# Patient Record
Sex: MALE | Race: BLACK OR AFRICAN AMERICAN | Employment: UNEMPLOYED | URBAN - METROPOLITAN AREA
[De-identification: names, ages, dates, MRNs, and addresses within clinical notes are randomized per-mention and may not be internally consistent; named-entity substitution may affect disease eponyms.]

---

## 2022-02-08 ENCOUNTER — APPOINTMENT (OUTPATIENT)
Dept: CT IMAGING | Age: 57
DRG: 045 | End: 2022-02-08
Attending: STUDENT IN AN ORGANIZED HEALTH CARE EDUCATION/TRAINING PROGRAM
Payer: COMMERCIAL

## 2022-02-08 ENCOUNTER — HOSPITAL ENCOUNTER (INPATIENT)
Age: 57
LOS: 2 days | Discharge: HOME | DRG: 045 | End: 2022-02-10
Attending: STUDENT IN AN ORGANIZED HEALTH CARE EDUCATION/TRAINING PROGRAM | Admitting: HOSPITALIST
Payer: COMMERCIAL

## 2022-02-08 DIAGNOSIS — I10 HYPERTENSION, UNSPECIFIED TYPE: ICD-10-CM

## 2022-02-08 DIAGNOSIS — R73.9 HYPERGLYCEMIA: ICD-10-CM

## 2022-02-08 DIAGNOSIS — E11.65 UNCONTROLLED TYPE 2 DIABETES MELLITUS WITH HYPERGLYCEMIA (HCC): ICD-10-CM

## 2022-02-08 DIAGNOSIS — I63.9 ACUTE ISCHEMIC STROKE (HCC): Primary | ICD-10-CM

## 2022-02-08 DIAGNOSIS — H53.461 HOMONYMOUS HEMIANOPIA, RIGHT: ICD-10-CM

## 2022-02-08 LAB
ALBUMIN SERPL-MCNC: 3.8 G/DL (ref 3.5–5)
ALBUMIN/GLOB SERPL: 1 {RATIO} (ref 1.1–2.2)
ALP SERPL-CCNC: 76 U/L (ref 45–117)
ALT SERPL-CCNC: 40 U/L (ref 12–78)
ANION GAP SERPL CALC-SCNC: 12 MMOL/L (ref 5–15)
AST SERPL-CCNC: 27 U/L (ref 15–37)
BASOPHILS # BLD: 0 K/UL (ref 0–0.1)
BASOPHILS NFR BLD: 1 % (ref 0–1)
BILIRUB SERPL-MCNC: 0.6 MG/DL (ref 0.2–1)
BUN SERPL-MCNC: 9 MG/DL (ref 6–20)
BUN/CREAT SERPL: 13 (ref 12–20)
CALCIUM SERPL-MCNC: 9.2 MG/DL (ref 8.5–10.1)
CHLORIDE SERPL-SCNC: 99 MMOL/L (ref 97–108)
CO2 SERPL-SCNC: 26 MMOL/L (ref 21–32)
CREAT SERPL-MCNC: 0.69 MG/DL (ref 0.7–1.3)
DIFFERENTIAL METHOD BLD: ABNORMAL
EOSINOPHIL # BLD: 0.1 K/UL (ref 0–0.4)
EOSINOPHIL NFR BLD: 1 % (ref 0–7)
ERYTHROCYTE [DISTWIDTH] IN BLOOD BY AUTOMATED COUNT: 12.6 % (ref 11.5–14.5)
GLOBULIN SER CALC-MCNC: 3.9 G/DL (ref 2–4)
GLUCOSE BLD STRIP.AUTO-MCNC: 342 MG/DL (ref 65–117)
GLUCOSE SERPL-MCNC: 362 MG/DL (ref 65–100)
HCT VFR BLD AUTO: 48.5 % (ref 36.6–50.3)
HGB BLD-MCNC: 16.6 G/DL (ref 12.1–17)
IMM GRANULOCYTES # BLD AUTO: 0 K/UL (ref 0–0.04)
IMM GRANULOCYTES NFR BLD AUTO: 1 % (ref 0–0.5)
INR PPP: 1 (ref 0.9–1.1)
LYMPHOCYTES # BLD: 1.7 K/UL (ref 0.8–3.5)
LYMPHOCYTES NFR BLD: 32 % (ref 12–49)
MCH RBC QN AUTO: 29 PG (ref 26–34)
MCHC RBC AUTO-ENTMCNC: 34.2 G/DL (ref 30–36.5)
MCV RBC AUTO: 84.8 FL (ref 80–99)
MONOCYTES # BLD: 0.7 K/UL (ref 0–1)
MONOCYTES NFR BLD: 13 % (ref 5–13)
NEUTS SEG # BLD: 2.8 K/UL (ref 1.8–8)
NEUTS SEG NFR BLD: 52 % (ref 32–75)
NRBC # BLD: 0 K/UL (ref 0–0.01)
NRBC BLD-RTO: 0 PER 100 WBC
PLATELET # BLD AUTO: 168 K/UL (ref 150–400)
PMV BLD AUTO: 9.1 FL (ref 8.9–12.9)
POTASSIUM SERPL-SCNC: 4.2 MMOL/L (ref 3.5–5.1)
PROT SERPL-MCNC: 7.7 G/DL (ref 6.4–8.2)
PROTHROMBIN TIME: 9.9 SEC (ref 9–11.1)
RBC # BLD AUTO: 5.72 M/UL (ref 4.1–5.7)
SERVICE CMNT-IMP: ABNORMAL
SODIUM SERPL-SCNC: 137 MMOL/L (ref 136–145)
WBC # BLD AUTO: 5.3 K/UL (ref 4.1–11.1)

## 2022-02-08 PROCEDURE — 85610 PROTHROMBIN TIME: CPT

## 2022-02-08 PROCEDURE — 85025 COMPLETE CBC W/AUTO DIFF WBC: CPT

## 2022-02-08 PROCEDURE — 70496 CT ANGIOGRAPHY HEAD: CPT

## 2022-02-08 PROCEDURE — 74011250636 HC RX REV CODE- 250/636: Performed by: STUDENT IN AN ORGANIZED HEALTH CARE EDUCATION/TRAINING PROGRAM

## 2022-02-08 PROCEDURE — 93005 ELECTROCARDIOGRAM TRACING: CPT

## 2022-02-08 PROCEDURE — 36415 COLL VENOUS BLD VENIPUNCTURE: CPT

## 2022-02-08 PROCEDURE — 70450 CT HEAD/BRAIN W/O DYE: CPT

## 2022-02-08 PROCEDURE — 99285 EMERGENCY DEPT VISIT HI MDM: CPT

## 2022-02-08 PROCEDURE — 80053 COMPREHEN METABOLIC PANEL: CPT

## 2022-02-08 PROCEDURE — 74011000636 HC RX REV CODE- 636: Performed by: STUDENT IN AN ORGANIZED HEALTH CARE EDUCATION/TRAINING PROGRAM

## 2022-02-08 PROCEDURE — 82962 GLUCOSE BLOOD TEST: CPT

## 2022-02-08 PROCEDURE — 96360 HYDRATION IV INFUSION INIT: CPT

## 2022-02-08 PROCEDURE — 74011250637 HC RX REV CODE- 250/637: Performed by: STUDENT IN AN ORGANIZED HEALTH CARE EDUCATION/TRAINING PROGRAM

## 2022-02-08 PROCEDURE — 65270000029 HC RM PRIVATE

## 2022-02-08 RX ORDER — LABETALOL HYDROCHLORIDE 5 MG/ML
10 INJECTION, SOLUTION INTRAVENOUS
Status: ACTIVE | OUTPATIENT
Start: 2022-02-08 | End: 2022-02-09

## 2022-02-08 RX ORDER — LABETALOL HYDROCHLORIDE 5 MG/ML
10 INJECTION, SOLUTION INTRAVENOUS ONCE
Status: DISCONTINUED | OUTPATIENT
Start: 2022-02-08 | End: 2022-02-08

## 2022-02-08 RX ORDER — ASPIRIN 325 MG
325 TABLET ORAL
Status: COMPLETED | OUTPATIENT
Start: 2022-02-08 | End: 2022-02-08

## 2022-02-08 RX ADMIN — ASPIRIN 325 MG ORAL TABLET 325 MG: 325 PILL ORAL at 18:11

## 2022-02-08 RX ADMIN — SODIUM CHLORIDE 1000 ML: 9 INJECTION, SOLUTION INTRAVENOUS at 16:00

## 2022-02-08 RX ADMIN — IOPAMIDOL 100 ML: 755 INJECTION, SOLUTION INTRAVENOUS at 17:43

## 2022-02-08 NOTE — ED PROVIDER NOTES
Chief Complaint   Patient presents with    Loss of Vision     This is a 61-year-old male with a history of hypertension, diabetes, psoriasis, referred here by his ophthalmologist for right temporal peripheral vision loss which he first appreciated 1.5 weeks ago. He had a normal funduscopic exam in the office and was referred here due to concern for an acute stroke. He reports intermittent dizziness, characterized as a sensation of feeling off balance during this time, but has been ambulatory. Denies any fevers, headache, neck pain, vomiting today. No speech deficit. No lateralizing weakness or sensory changes. He has not had medical insurance and therefore has not taken his medications for blood pressure or diabetes for years. No history of prior stroke. No other systemic complaints. Symptoms are moderate in nature without alleviating factors. Past Medical History:   Diagnosis Date    Diabetes ([facility] [address]     Hypertension     Psoriasis        History reviewed. No pertinent surgical history. History reviewed. No pertinent family history. Social History     Socioeconomic History    Marital status:      Spouse name: Not on file    Number of children: Not on file    Years of education: Not on file    Highest education level: Not on file   Occupational History    Not on file   Tobacco Use    Smoking status: Never Smoker    Smokeless tobacco: Not on file   Substance and Sexual Activity    Alcohol use: Never    Drug use: Never    Sexual activity: Not on file   Other Topics Concern    Not on file   Social History Narrative    Not on file     Social Determinants of Health     Financial Resource Strain:     Difficulty of Paying Living Expenses: Not on file   Food Insecurity:     Worried About Running Out of Food in the Last Year: Not on file    Arabella of Food in the Last Year: Not on file   Transportation Needs:     Lack of Transportation (Medical):  Not on file    Lack of Transportation (Non-Medical): Not on file   Physical Activity:     Days of Exercise per Week: Not on file    Minutes of Exercise per Session: Not on file   Stress:     Feeling of Stress : Not on file   Social Connections:     Frequency of Communication with Friends and Family: Not on file    Frequency of Social Gatherings with Friends and Family: Not on file    Attends Orthodoxy Services: Not on file    Active Member of [address] [place] [address] or Organizations: Not on file    Attends Club or Organization Meetings: Not on file    Marital Status: Not on file   Intimate Partner Violence:     Fear of Current or Ex-Partner: Not on file    Emotionally Abused: Not on file    Physically Abused: Not on file    Sexually Abused: Not on file   Housing Stability:     Unable to Pay for Housing in the Last Year: Not on file    Number of [place] in the Last Year: Not on file    Unstable Housing in the Last Year: Not on file         ALLERGIES: Tree nut    Review of Systems   Constitutional: Negative for fever. HENT: Negative for facial swelling. Eyes: Positive for visual disturbance. Respiratory: Negative for shortness of breath. Cardiovascular: Negative for chest pain. Gastrointestinal: Negative for abdominal pain. Genitourinary: Negative for difficulty urinating. Musculoskeletal: Negative for neck pain. Neurological: Positive for dizziness. Psychiatric/Behavioral: Negative for confusion.        Vitals:    02/08/22 1551 02/08/22 1606 02/08/22 1621 02/08/22 1636   BP: (!) 177/110 (!) 175/103 (!) 178/104 (!) 178/102   Pulse: 86 80 79    Resp: 24 12 16    Temp:       SpO2: 98% 98% 99%    Weight:       Height:                Physical Exam  General:  Awake and alert, NAD  HEENT:  NC/AT, equal pupils, moist mucous membranes  Neck:   Normal inspection, full range of motion  Cardiac:  RRR, no murmurs  Respiratory:  Clear bilaterally, no wheezes, rales, rhonchi  Abdomen:  Soft and nontender, nondistended  Extremities: Warm and well perfused, no peripheral edema  Neuro:  +Right temporal homonymous hemianopia present, cranial nerves otherwise grossly intact, no pronator drift, strength is full and symmetric in the upper and lower extremities, no sensory deficits appreciated  Moving all extremities symmetrically without gross motor deficit  Skin:   +Well circumscribed patchy areas of raised erythema consistent with psoriasis    RESULTS  Recent Results (from the past 12 hour(s))   GLUCOSE, POC    Collection Time: 02/08/22  3:10 PM   Result Value Ref Range    Glucose (POC) 342 (H) 65 - 117 mg/dL    Performed by Kathleen Suazo ([facility])    CBC WITH AUTOMATED DIFF    Collection Time: 02/08/22  3:24 PM   Result Value Ref Range    WBC 5.3 4.1 - 11.1 K/uL    RBC 5.72 (H) 4.10 - 5.70 M/uL    HGB 16.6 12.1 - 17.0 g/dL    HCT 48.5 36.6 - 50.3 %    MCV 84.8 80.0 - 99.0 FL    MCH 29.0 26.0 - 34.0 PG    MCHC 34.2 30.0 - 36.5 g/dL    RDW 12.6 11.5 - 14.5 %    PLATELET 350 070 - 148 K/uL    MPV 9.1 8.9 - 12.9 FL    NRBC 0.0 0  WBC    ABSOLUTE NRBC 0.00 0.00 - 0.01 K/uL    NEUTROPHILS 52 32 - 75 %    LYMPHOCYTES 32 12 - 49 %    MONOCYTES 13 5 - 13 %    EOSINOPHILS 1 0 - 7 %    BASOPHILS 1 0 - 1 %    IMMATURE GRANULOCYTES 1 (H) 0.0 - 0.5 %    ABS. NEUTROPHILS 2.8 1.8 - 8.0 K/UL    ABS. LYMPHOCYTES 1.7 0.8 - 3.5 K/UL    ABS. MONOCYTES 0.7 0.0 - 1.0 K/UL    ABS. EOSINOPHILS 0.1 0.0 - 0.4 K/UL    ABS. BASOPHILS 0.0 0.0 - 0.1 K/UL    ABS. IMM.  GRANS. 0.0 0.00 - 0.04 K/UL    DF AUTOMATED     METABOLIC PANEL, COMPREHENSIVE    Collection Time: 02/08/22  3:24 PM   Result Value Ref Range    Sodium 137 136 - 145 mmol/L    Potassium 4.2 3.5 - 5.1 mmol/L    Chloride 99 97 - 108 mmol/L    CO2 26 21 - 32 mmol/L    Anion gap 12 5 - 15 mmol/L    Glucose 362 (H) 65 - 100 mg/dL    BUN 9 6 - 20 MG/DL    Creatinine 0.69 (L) 0.70 - 1.30 MG/DL    BUN/Creatinine ratio 13 12 - 20      GFR est AA >60 >60 ml/min/1.73m2    GFR est non-AA >60 >60 ml/min/1.73m2    Calcium 9.2 8.5 - 10.1 MG/DL    Bilirubin, total 0.6 0.2 - 1.0 MG/DL    ALT (SGPT) 40 12 - 78 U/L    AST (SGOT) 27 15 - 37 U/L    Alk. phosphatase 76 45 - 117 U/L    Protein, total 7.7 6.4 - 8.2 g/dL    Albumin 3.8 3.5 - 5.0 g/dL    Globulin 3.9 2.0 - 4.0 g/dL    A-G Ratio 1.0 (L) 1.1 - 2.2     PROTHROMBIN TIME + INR    Collection Time: 02/08/22  3:24 PM   Result Value Ref Range    INR 1.0 0.9 - 1.1      Prothrombin time 9.9 9.0 - 11.1 sec        IMAGING  CT HEAD WO CONT    Result Date: 2/8/2022  Hypodensity in the left occipital lobe most consistent with subacute ischemia. CTA HEAD NECK W CONT    Result Date: 2/8/2022  1. Subacute ischemia left occipital lobe ischemia. 2.  No large vessel occlusion is identified. There is moderate stenosis of the basilar artery and irregularity and possible stenosis of the left P1 segment. 3.  Multiple hypodensities in the thyroid gland. .      Procedures - none unless documented below  EKG as interpreted by me:  Normal sinus rhythm at a rate of 83, normal axis, normal QRS interval, LVH by aVL criteria, grossly no ST or T-wave changes suggesting acute ischemia. ED course: Labs, EKG and imaging reviewed. NIHSS 1 at the time of my exam.  Subacute left occipital lobe infarct on CT, no large vessel occlusion on angiogram studies. Aspirin 324 mg given. Will admit for continued management, discussed with the hospitalist.      Sarah Viramontes Serve for Admission  3:27 PM    ED Room Number:   SER05/05  Patient Name and age: Mayra Pacheco 49 y.o.  male  Working Diagnosis:     1. Acute ischemic stroke ([address]    2. Homonymous hemianopia, right    3. Hyperglycemia    4.  Hypertension, unspecified type      COVID suspicion:   no  Code Status:    Full Code  Readmission:    no  Isolation Requirements:  no  Recommended Level of Care: telemetry  Department:    [place] [facility] - [phone]  Other:     Subacute left occipital lobe infarct on CT, no large vessel occlusion. Aspirin 324 mg given.

## 2022-02-08 NOTE — ED TRIAGE NOTES
Ambulatory.  1 1/2 weeks ago lost vision in right eye on the right side.  was seen at the eye doctor today and was told that it was not an eye problem and to go to the ED for stroke. Patient states mild intermittent dizziness.

## 2022-02-09 ENCOUNTER — APPOINTMENT (OUTPATIENT)
Dept: MRI IMAGING | Age: 57
DRG: 045 | End: 2022-02-09
Attending: HOSPITALIST
Payer: COMMERCIAL

## 2022-02-09 LAB
ALBUMIN SERPL-MCNC: 3.3 G/DL (ref 3.5–5)
ALBUMIN/GLOB SERPL: 1 {RATIO} (ref 1.1–2.2)
ALP SERPL-CCNC: 69 U/L (ref 45–117)
ALT SERPL-CCNC: 37 U/L (ref 12–78)
ANION GAP SERPL CALC-SCNC: 6 MMOL/L (ref 5–15)
AST SERPL-CCNC: 21 U/L (ref 15–37)
ATRIAL RATE: 83 BPM
BASOPHILS # BLD: 0 K/UL (ref 0–0.1)
BASOPHILS NFR BLD: 1 % (ref 0–1)
BILIRUB SERPL-MCNC: 0.8 MG/DL (ref 0.2–1)
BUN SERPL-MCNC: 6 MG/DL (ref 6–20)
BUN/CREAT SERPL: 10 (ref 12–20)
CALCIUM SERPL-MCNC: 8.7 MG/DL (ref 8.5–10.1)
CALCULATED P AXIS, ECG09: 47 DEGREES
CALCULATED R AXIS, ECG10: 14 DEGREES
CALCULATED T AXIS, ECG11: -18 DEGREES
CHLORIDE SERPL-SCNC: 102 MMOL/L (ref 97–108)
CHOLEST SERPL-MCNC: 213 MG/DL
CO2 SERPL-SCNC: 26 MMOL/L (ref 21–32)
COVID-19 RAPID TEST, COVR: NOT DETECTED
CREAT SERPL-MCNC: 0.62 MG/DL (ref 0.7–1.3)
DIAGNOSIS, 93000: NORMAL
DIFFERENTIAL METHOD BLD: ABNORMAL
EOSINOPHIL # BLD: 0.1 K/UL (ref 0–0.4)
EOSINOPHIL NFR BLD: 2 % (ref 0–7)
ERYTHROCYTE [DISTWIDTH] IN BLOOD BY AUTOMATED COUNT: 12.3 % (ref 11.5–14.5)
EST. AVERAGE GLUCOSE BLD GHB EST-MCNC: 298 MG/DL
GLOBULIN SER CALC-MCNC: 3.3 G/DL (ref 2–4)
GLUCOSE BLD STRIP.AUTO-MCNC: 313 MG/DL (ref 65–117)
GLUCOSE BLD STRIP.AUTO-MCNC: 327 MG/DL (ref 65–117)
GLUCOSE BLD STRIP.AUTO-MCNC: 339 MG/DL (ref 65–117)
GLUCOSE SERPL-MCNC: 326 MG/DL (ref 65–100)
HBA1C MFR BLD: 12 % (ref 4–5.6)
HCT VFR BLD AUTO: 45.4 % (ref 36.6–50.3)
HDLC SERPL-MCNC: 49 MG/DL
HDLC SERPL: 4.3 {RATIO} (ref 0–5)
HGB BLD-MCNC: 15.2 G/DL (ref 12.1–17)
IMM GRANULOCYTES # BLD AUTO: 0 K/UL (ref 0–0.04)
IMM GRANULOCYTES NFR BLD AUTO: 0 % (ref 0–0.5)
LDLC SERPL CALC-MCNC: 147.2 MG/DL (ref 0–100)
LYMPHOCYTES # BLD: 1.4 K/UL (ref 0.8–3.5)
LYMPHOCYTES NFR BLD: 33 % (ref 12–49)
MCH RBC QN AUTO: 28.7 PG (ref 26–34)
MCHC RBC AUTO-ENTMCNC: 33.5 G/DL (ref 30–36.5)
MCV RBC AUTO: 85.7 FL (ref 80–99)
MONOCYTES # BLD: 0.5 K/UL (ref 0–1)
MONOCYTES NFR BLD: 12 % (ref 5–13)
NEUTS SEG # BLD: 2.1 K/UL (ref 1.8–8)
NEUTS SEG NFR BLD: 52 % (ref 32–75)
NRBC # BLD: 0 K/UL (ref 0–0.01)
NRBC BLD-RTO: 0 PER 100 WBC
P-R INTERVAL, ECG05: 224 MS
PLATELET # BLD AUTO: 165 K/UL (ref 150–400)
PMV BLD AUTO: 8.8 FL (ref 8.9–12.9)
POTASSIUM SERPL-SCNC: 3.8 MMOL/L (ref 3.5–5.1)
PROT SERPL-MCNC: 6.6 G/DL (ref 6.4–8.2)
Q-T INTERVAL, ECG07: 412 MS
QRS DURATION, ECG06: 104 MS
QTC CALCULATION (BEZET), ECG08: 484 MS
RBC # BLD AUTO: 5.3 M/UL (ref 4.1–5.7)
SERVICE CMNT-IMP: ABNORMAL
SODIUM SERPL-SCNC: 134 MMOL/L (ref 136–145)
SOURCE, COVRS: NORMAL
TRIGL SERPL-MCNC: 84 MG/DL (ref ?–150)
VENTRICULAR RATE, ECG03: 83 BPM
VLDLC SERPL CALC-MCNC: 16.8 MG/DL
WBC # BLD AUTO: 4.2 K/UL (ref 4.1–11.1)

## 2022-02-09 PROCEDURE — 97535 SELF CARE MNGMENT TRAINING: CPT

## 2022-02-09 PROCEDURE — 97112 NEUROMUSCULAR REEDUCATION: CPT

## 2022-02-09 PROCEDURE — 97165 OT EVAL LOW COMPLEX 30 MIN: CPT

## 2022-02-09 PROCEDURE — 70551 MRI BRAIN STEM W/O DYE: CPT

## 2022-02-09 PROCEDURE — 80053 COMPREHEN METABOLIC PANEL: CPT

## 2022-02-09 PROCEDURE — 65660000000 HC RM CCU STEPDOWN

## 2022-02-09 PROCEDURE — 74011636637 HC RX REV CODE- 636/637: Performed by: HOSPITALIST

## 2022-02-09 PROCEDURE — 99222 1ST HOSP IP/OBS MODERATE 55: CPT | Performed by: PSYCHIATRY & NEUROLOGY

## 2022-02-09 PROCEDURE — 36415 COLL VENOUS BLD VENIPUNCTURE: CPT

## 2022-02-09 PROCEDURE — 83036 HEMOGLOBIN GLYCOSYLATED A1C: CPT

## 2022-02-09 PROCEDURE — 85025 COMPLETE CBC W/AUTO DIFF WBC: CPT

## 2022-02-09 PROCEDURE — 80061 LIPID PANEL: CPT

## 2022-02-09 PROCEDURE — 82962 GLUCOSE BLOOD TEST: CPT

## 2022-02-09 PROCEDURE — 74011250637 HC RX REV CODE- 250/637: Performed by: HOSPITALIST

## 2022-02-09 PROCEDURE — 87635 SARS-COV-2 COVID-19 AMP PRB: CPT

## 2022-02-09 RX ORDER — DEXTROSE 50 % IN WATER (D50W) INTRAVENOUS SYRINGE
12.5-25 AS NEEDED
Status: DISCONTINUED | OUTPATIENT
Start: 2022-02-09 | End: 2022-02-09 | Stop reason: RX

## 2022-02-09 RX ORDER — GUAIFENESIN 100 MG/5ML
81 LIQUID (ML) ORAL DAILY
Status: DISCONTINUED | OUTPATIENT
Start: 2022-02-09 | End: 2022-02-10 | Stop reason: HOSPADM

## 2022-02-09 RX ORDER — AMLODIPINE BESYLATE 5 MG/1
5 TABLET ORAL DAILY
Status: DISCONTINUED | OUTPATIENT
Start: 2022-02-09 | End: 2022-02-10 | Stop reason: HOSPADM

## 2022-02-09 RX ORDER — INSULIN LISPRO 100 [IU]/ML
INJECTION, SOLUTION INTRAVENOUS; SUBCUTANEOUS
Status: DISCONTINUED | OUTPATIENT
Start: 2022-02-09 | End: 2022-02-10 | Stop reason: HOSPADM

## 2022-02-09 RX ORDER — MAGNESIUM SULFATE 100 %
4 CRYSTALS MISCELLANEOUS AS NEEDED
Status: DISCONTINUED | OUTPATIENT
Start: 2022-02-09 | End: 2022-02-10 | Stop reason: HOSPADM

## 2022-02-09 RX ORDER — ATORVASTATIN CALCIUM 40 MG/1
40 TABLET, FILM COATED ORAL DAILY
Status: DISCONTINUED | OUTPATIENT
Start: 2022-02-09 | End: 2022-02-09

## 2022-02-09 RX ORDER — ATORVASTATIN CALCIUM 40 MG/1
80 TABLET, FILM COATED ORAL DAILY
Status: DISCONTINUED | OUTPATIENT
Start: 2022-02-10 | End: 2022-02-10 | Stop reason: HOSPADM

## 2022-02-09 RX ADMIN — AMLODIPINE BESYLATE 5 MG: 5 TABLET ORAL at 10:17

## 2022-02-09 RX ADMIN — Medication 7 UNITS: at 17:07

## 2022-02-09 RX ADMIN — ASPIRIN 81 MG 81 MG: 81 TABLET ORAL at 10:17

## 2022-02-09 RX ADMIN — Medication 4 UNITS: at 22:59

## 2022-02-09 RX ADMIN — Medication 7 UNITS: at 11:40

## 2022-02-09 RX ADMIN — ATORVASTATIN CALCIUM 40 MG: 40 TABLET, FILM COATED ORAL at 10:17

## 2022-02-09 NOTE — CONSULTS
[address] [place] [address]    Patient: Jorge Joy MRN: [id number]  SSN: [id number]    YOB: 1965  Age: 49 y.o. Sex: male      Chief Complaint: Vision loss    Subjective: Jorge Joy is a 49 y.o. M with a pmh of HTN, psoriasis and DMII who presented to the ED on 2/8/22. He was referred here by his ophthalmologist for right temporal peripheral vision loss that started approx 1.5 weeks ago. Per notes, fundoscopic exam was normal. CT head was obtained which showed a hypodensity in the left occipital lobe most consistent with subacute ischemia. NIS has been consulted because CTA head/neck showed moderate stenosis of the  basilar artery and irregularity and possible stenosis of the left P1 segment. He states that he does not take any medications at home, reports his BP usually runs in the \"150s\" and he does not check his BG at home. He states his DMII is diet controlled and that he walks 2 miles every day. He does not take any ASA or blood thinner at home. He does not smoke cigarettes or use any street drugs. Past Medical History:   Diagnosis Date    Diabetes ([address]     Hypertension     Psoriasis      History reviewed. No pertinent family history. Social History     Tobacco Use    Smoking status: Never Smoker    Smokeless tobacco: Not on file   Substance Use Topics    Alcohol use: Never        Allergies   Allergen Reactions    Tree Nut Nausea and Vomiting     Review of Systems:  ROS negative. Denies numbness, tingling, nausea, vomiting, difficulty swallowing or speaking, headache, and weakness. Objective:     Vitals:    02/09/22 0415 02/09/22 0430 02/09/22 0537 02/09/22 0600   BP:  (!) 155/100 (!) 179/103    Pulse: 93 82 79 77   Resp: 17 12 12    Temp:   98 °F (36.7 °C)    SpO2: 97% 98% 99%    Weight:       Height:          Physical Exam:  GENERAL: Calm, cooperative, NAD  SKIN: Warm, dry, color appropriate for ethnicity.      Neurologic Exam:  Mental Status:  Alert and oriented x 4. Appropriate affect, mood and behavior. Language:    Normal fluency, repetition, comprehension and naming. Cranial Nerves:   Pupils 3 mm, equal, round and reactive to light. Visual fields right right hemianopsia. Extraocular movements intact. Facial sensation intact. Full facial strength, no asymmetry. Hearing grossly intact bilaterally. No dysarthria. Tongue protrudes to midline, palate elevates symmetrically. Shoulder shrug 5/5 bilaterally. Motor:    No pronator drift. Bulk and tone normal.      5/5 power in all extremities proximally and distally. No involuntary movements. Sensation:    Sensation intact throughout to light touch. Coordination & Gait: Gait deferred. FTN and HTS intact with no ataxia present. NIHSS:      1a-LOC:0    1b-Month/Age:0    1c-Open/Close Hand:0    2-Best Gaze:0    3-Visual Fields:2    4-Facial Palsy:0    5a-Left Arm:0    5b-Right Arm:0    6a-Left Le    6b-Right Le    7-Limb Ataxia:0    8-Sensory:0    9-Best Language:0    10-Dysarthria:0    11-Extinction/Inattention:0  TOTAL SCORE:2    Labs:  Lab Results   Component Value Date/Time    WBC 5.3 2022 03:24 PM    HGB 16.6 2022 03:24 PM    HCT 48.5 2022 03:24 PM    PLATELET 882  03:24 PM    MCV 84.8 2022 03:24 PM      Lab Results   Component Value Date/Time    Sodium 137 2022 03:24 PM    Potassium 4.2 2022 03:24 PM    Chloride 99 2022 03:24 PM    CO2 26 2022 03:24 PM    Anion gap 12 2022 03:24 PM    Glucose 362 (H) 2022 03:24 PM    BUN 9 2022 03:24 PM    Creatinine 0.69 (L) 2022 03:24 PM    BUN/Creatinine ratio 13 2022 03:24 PM    GFR est AA >60 2022 03:24 PM    GFR est non-AA >60 2022 03:24 PM    Calcium 9.2 2022 03:24 PM     Imaging:  CT Results (maximum last 3):   Results from [place] encounter on 02/08/22    CTA HEAD NECK W CONT    Narrative  EXAM: CTA HEAD NECK W CONT    INDICATION: Right temporal field cut    COMPARISON: None. CONTRAST: 100 mL of Isovue-370. TECHNIQUE:  Unenhanced  images were obtained to localize the volume for  acquisition. Multislice helical axial CT angiography was performed from the  aortic arch to the top of the head during uneventful rapid bolus intravenous  contrast administration. Coronal and sagittal reformations and 3D post  processing was performed. CT dose reduction was achieved through use of a  standardized protocol tailored for this examination and automatic exposure  control for dose modulation. FINDINGS:    CTA NECK  There is conventional three vessel arch anatomy. The bilateral subclavian,  common carotid, and internal carotid arteries are patent with no flow-limiting  stenosis. % of right carotid artery stenosis: No significant stenosis  % of left carotid artery stenosis: No significant stenosis    NASCET method was utilized for calculating stenosis. Left vertebral artery is dominant. Multiple hypodensities are present in the  thyroid gland. .  There are mild degenerative changes of the cervical spine. CTA HEAD  Left vertebral artery is dominant. Both vertebral arteries are patent. . There is  moderate stenosis of the basilar artery. The left posterior cerebral artery is  irregular but otherwise patent. . The right posterior cerebral artery is patent. .  There is no flow-limiting intracranial stenosis. There is no aneurysm. There are  no sizable posterior communicating arteries. Delayed images demonstrate hypodensity left occipital lobe with peripheral  enhancement compatible with subacute ischemia. Impression  1. Subacute ischemia left occipital lobe ischemia. 2.  No large vessel occlusion is identified. There is moderate stenosis of the  basilar artery and irregularity and possible stenosis of the left P1 segment.     3.  Multiple hypodensities in the thyroid gland. .      CT HEAD WO CONT    Narrative  EXAM: CT HEAD WO CONT    INDICATION: Right temporal field cut    COMPARISON: None. CONTRAST: None. TECHNIQUE: Unenhanced CT of the head was performed using 5 mm images. Brain and  bone windows were generated. Coronal and sagittal reformats. CT dose reduction  was achieved through use of a standardized protocol tailored for this  examination and automatic exposure control for dose modulation. FINDINGS:  The ventricles and sulci are normal in size, shape and configuration. . There is  no significant white matter disease. There is no intracranial hemorrhage,  extra-axial collection, or mass effect. The basilar cisterns are open. Ill-defined hypodensity is noted in the left occipital lobe. a    The bone windows demonstrate no abnormalities. The visualized portions of the  paranasal sinuses and mastoid air cells are clear. Impression  Hypodensity in the left occipital lobe most consistent with subacute ischemia. Assessment:     Hospital Problems  Never Reviewed          Codes Class Noted POA    Stroke (cerebrum) [facility]) ICD-10-CM: I63.9  ICD-9-CM: 434.91  2/8/2022 Unknown            Plan:     1.) Basilar artery stenosis   - As seen on CTA head with hx of poorly controlled HTN and DMII at home   - Cont ASA 81 mg daily   - No neurointervention recommended at this time    2.) Acute ischemic occipital lobe CVA   - MRI brain ordered   - Recommending optimizing BP and glucose control long term as outpatient   - Stoke work up per neurology       I have discussed the diagnosis and the intended plan as seen in the above orders with Dr. Danny Ruiz, the patient and the primary RN. Patient/family updated on current plan of care and is in agreement. All questions were answered. Thank you for this consult and participating in the care of this patient.   Signed By: Trini Lund NP     February 9, 2022

## 2022-02-09 NOTE — H&P
[address] [facility] Adult  Hospitalist Group  History and Physical    Date of Service:  2/9/2022  Primary Care Provider: None  Source of information: The patient and Chart review    Chief Complaint: Loss of Vision      History of Presenting Illness:   Rema Slade is a 49 y.o. male with past medical history of diabetes, hypertension-states that he is not on any medications(states that he does not have insurance, also told me that he wanted to try lifestyle changes without medications) he came to the hospital with a chief complaint of vision loss. Patient states that he had vision abnormalities for few years, he had problems in his left eye and recently for the last 1 week noticed blurry vision in the right eye. He has seen an ophthalmologist yesterday-he was told that he does not have peripheral vision in both eyes, he recommended evaluation in the hospital for stroke. Patient states that he was diagnosed with hypertension maybe 15- 20 years ago. He denied any chest pain, nausea/vomiting, abdominal pain, diarrhea, shortness of breath and other constitutional symptoms. He denied any weakness, numbness/tingling. States that he took 2 doses of COVID-19 vaccine and he is due for booster     REVIEW OF SYSTEMS:  A comprehensive review of systems was negative except for that written in the History of Present Illness. Past Medical History:   Diagnosis Date    Diabetes ([address]     Hypertension     Psoriasis       History reviewed. No pertinent surgical history. Prior to Admission medications    Not on File     Allergies   Allergen Reactions    Tree Nut Nausea and Vomiting      History reviewed. No pertinent family history. Social History:  reports that he has never smoked. He does not have any smokeless tobacco history on file. He reports that he does not drink alcohol and does not use drugs.      Family and social history were personally reviewed, all pertinent and relevant details are outlined as above.    Objective:     Visit Vitals  BP (!) 179/103 (BP 1 Location: Left upper arm, BP Patient Position: At rest)   Pulse 77   Temp 98 °F (36.7 °C)   Resp 12   Ht 5' 7\" (1.702 m)   Wt 69.9 kg (154 lb 1.6 oz)   SpO2 99%   BMI 24.14 kg/m²      O2 Device: None (Room air)    PHYSICAL EXAM:   General: Alert x oriented x 3, awake, no acute distress, resting in bed, pleasant ,appears to be stated age  [de-identified]:  EOMI, moist mucus membranes  Neck: Supple  Chest: Clear to auscultation bilaterally b/l   CVS: RRR, S1 S2 heard, no murmurs  Abd: Soft, non-tender, non-distended, +bowel sounds   Ext: No clubbing, no cyanosis, no edema  Neuro/Psych:temporal vision lost both the eyes, alert and oriented X 3, muscle strength b/l UE intact,sensation intact  Skin: Warm, dry, without rashes or lesions    Data Review: All diagnostic labs and studies have been reviewed. Abnormal Labs Reviewed   CBC WITH AUTOMATED DIFF - Abnormal; Notable for the following components:       Result Value    RBC 5.72 (*)     IMMATURE GRANULOCYTES 1 (*)     All other components within normal limits   METABOLIC PANEL, COMPREHENSIVE - Abnormal; Notable for the following components:    Glucose 362 (*)     Creatinine 0.69 (*)     A-G Ratio 1.0 (*)     All other components within normal limits   GLUCOSE, POC - Abnormal; Notable for the following components:    Glucose (POC) 342 (*)     All other components within normal limits       All Micro Results     Procedure Component Value Units Date/Time    COVID-19 RAPID TEST [[id number]]     Order Status: Sent           IMAGING:   CT HEAD WO CONT   Final Result   Hypodensity in the left occipital lobe most consistent with subacute ischemia. CTA HEAD NECK W CONT   Final Result      1. Subacute ischemia left occipital lobe ischemia. 2.  No large vessel occlusion is identified. There is moderate stenosis of the   basilar artery and irregularity and possible stenosis of the left P1 segment.       3. Multiple hypodensities in the thyroid gland. [facility] MRI BRAIN WO CONT    (Results Pending)        ECG/ECHO:  No results found for this or any previous visit. Assessment:   Given the patient's current clinical presentation, there is a high level of concern for decompensation if discharged from the emergency department. Complex decision making was performed, which includes reviewing the patient's available past medical records, laboratory results, and imaging studies. Active Problems:    Stroke (cerebrum) ([address] (2/8/2022)      # Subacute left occipital stroke  -Hypodensity in the left occipital lobe most consistent with subacute ischemia. There is moderate stenosis of the  basilar artery and irregularity and possible stenosis of the left P1 segment.   -MRI brain,echo, lipid profile,A1c,COVID 19 rapid. -Aspirin,statin    # Hypertension:   -start amlodipine today  Goal less than 140    # Diabetes:  -check A1c  Add insulin based on sugars                DIET: ADULT DIET Regular   ISOLATION PRECAUTIONS: There are currently no Active Isolations  CODE STATUS: Full Code   DVT PROPHYLAXIS: Heparin  FUNCTIONAL STATUS PRIOR TO HOSPITALIZATION: Fully active and ambulatory; able to carry on all self-care without restriction. EARLY MOBILITY ASSESSMENT: Recommend routine ambulation while hospitalized with the assistance of nursing staff  ANTICIPATED DISCHARGE: Greater than 48 hours. Signed By: Sujatha Fierro MD     February 9, 2022         Please note that this dictation may have been completed with Dragon, the computer voice recognition software. Quite often unanticipated grammatical, syntax, homophones, and other interpretive errors are inadvertently transcribed by the computer software. Please disregard these errors. Please excuse any errors that have escaped final proofreading.

## 2022-02-09 NOTE — ED NOTES
Bedside and Verbal shift change report given to Krystyna Rahman RN (oncoming nurse) by Nancy Anderson RN (offgoing nurse). Report included the following information SBAR, Kardex, MAR and Recent Results.

## 2022-02-09 NOTE — PROGRESS NOTES
Problem: Falls - Risk of  Goal: *Absence of Falls  Description: Document Sheyla Koehler Fall Risk and appropriate interventions in the flowsheet. Outcome: Progressing Towards Goal  Note: Fall Risk Interventions:   Call light in reach. Bedside table within reach.           Problem: Patient Education: Go to Patient Education Activity  Goal: Patient/Family Education  Outcome: Progressing Towards Goal     Problem: Patient Education: Go to Patient Education Activity  Goal: Patient/Family Education  Outcome: Progressing Towards Goal     Problem: TIA/CVA Stroke: 0-24 hours  Goal: Off Pathway (Use only if patient is Off Pathway)  Outcome: Progressing Towards Goal  Goal: Activity/Safety  Outcome: Progressing Towards Goal  Goal: Consults, if ordered  Outcome: Progressing Towards Goal  Goal: Diagnostic Test/Procedures  Outcome: Progressing Towards Goal  Goal: Nutrition/Diet  Outcome: Progressing Towards Goal  Goal: Discharge Planning  Outcome: Progressing Towards Goal  Goal: Medications  Outcome: Progressing Towards Goal  Goal: Respiratory  Outcome: Progressing Towards Goal  Goal: Treatments/Interventions/Procedures  Outcome: Progressing Towards Goal  Goal: Minimize risk of bleeding post-thrombolytic infusion  Outcome: Progressing Towards Goal  Goal: Monitor for complications post-thrombolytic infusion  Outcome: Progressing Towards Goal  Goal: Psychosocial  Outcome: Progressing Towards Goal  Goal: *Hemodynamically stable  Outcome: Progressing Towards Goal  Goal: *Neurologically stable  Description: Absence of additional neurological deficits    Outcome: Progressing Towards Goal  Goal: *Verbalizes anxiety and depression are reduced or absent  Outcome: Progressing Towards Goal  Goal: *Absence of Signs of Aspiration on Current Diet  Outcome: Progressing Towards Goal  Goal: *Absence of deep venous thrombosis signs and symptoms(Stroke Metric)  Outcome: Progressing Towards Goal  Goal: *Ability to perform ADLs and demonstrates progressive mobility and function  Outcome: Progressing Towards Goal  Goal: *Stroke education started(Stroke Metric)  Outcome: Progressing Towards Goal  Goal: *Dysphagia screen performed(Stroke Metric)  Outcome: Progressing Towards Goal  Goal: *Rehab consulted(Stroke Metric)  Outcome: Progressing Towards Goal     Problem: TIA/CVA Stroke: Day 2 Until Discharge  Goal: Off Pathway (Use only if patient is Off Pathway)  Outcome: Progressing Towards Goal  Goal: Activity/Safety  Outcome: Progressing Towards Goal  Goal: Diagnostic Test/Procedures  Outcome: Progressing Towards Goal  Goal: Nutrition/Diet  Outcome: Progressing Towards Goal  Goal: Discharge Planning  Outcome: Progressing Towards Goal  Goal: Medications  Outcome: Progressing Towards Goal  Goal: Respiratory  Outcome: Progressing Towards Goal  Goal: Treatments/Interventions/Procedures  Outcome: Progressing Towards Goal  Goal: Psychosocial  Outcome: Progressing Towards Goal  Goal: *Verbalizes anxiety and depression are reduced or absent  Outcome: Progressing Towards Goal  Goal: *Absence of aspiration  Outcome: Progressing Towards Goal  Goal: *Absence of deep venous thrombosis signs and symptoms(Stroke Metric)  Outcome: Progressing Towards Goal  Goal: *Optimal pain control at patient's stated goal  Outcome: Progressing Towards Goal  Goal: *Tolerating diet  Outcome: Progressing Towards Goal  Goal: *Ability to perform ADLs and demonstrates progressive mobility and function  Outcome: Progressing Towards Goal  Goal: *Stroke education continued(Stroke Metric)  Outcome: Progressing Towards Goal     Problem: Ischemic Stroke: Discharge Outcomes  Goal: *Verbalizes anxiety and depression are reduced or absent  Outcome: Progressing Towards Goal  Goal: *Verbalize understanding of risk factor modification(Stroke Metric)  Outcome: Progressing Towards Goal  Goal: *Hemodynamically stable  Outcome: Progressing Towards Goal  Goal: *Absence of aspiration pneumonia  Outcome: Progressing Towards Goal  Goal: *Aware of needed dietary changes  Outcome: Progressing Towards Goal  Goal: *Verbalize understanding of prescribed medications including anti-coagulants, anti-lipid, and/or anti-platelets(Stroke Metric)  Outcome: Progressing Towards Goal  Goal: *Tolerating diet  Outcome: Progressing Towards Goal  Goal: *Aware of follow-up diagnostics related to anticoagulants  Outcome: Progressing Towards Goal  Goal: *Ability to perform ADLs and demonstrates progressive mobility and function  Outcome: Progressing Towards Goal  Goal: *Absence of DVT(Stroke Metric)  Outcome: Progressing Towards Goal  Goal: *Absence of aspiration  Outcome: Progressing Towards Goal  Goal: *Optimal pain control at patient's stated goal  Outcome: Progressing Towards Goal  Goal: *Home safety concerns addressed  Outcome: Progressing Towards Goal  Goal: *Describes available resources and support systems  Outcome: Progressing Towards Goal  Goal: *Verbalizes understanding of activation of EMS(911) for stroke symptoms(Stroke Metric)  Outcome: Progressing Towards Goal  Goal: *Understands and describes signs and symptoms to report to providers(Stroke Metric)  Outcome: Progressing Towards Goal  Goal: *Neurolgocially stable (absence of additional neurological deficits)  Outcome: Progressing Towards Goal  Goal: *Verbalizes importance of follow-up with primary care physician(Stroke Metric)  Outcome: Progressing Towards Goal  Goal: *Smoking cessation discussed,if applicable(Stroke Metric)  Outcome: Progressing Towards Goal  Goal: *Depression screening completed(Stroke Metric)  Outcome: Progressing Towards Goal     Problem: Pain  Goal: *Control of Pain  Outcome: Progressing Towards Goal  Goal: *PALLIATIVE CARE:  Alleviation of Pain  Outcome: Progressing Towards Goal     Problem: Patient Education: Go to Patient Education Activity  Goal: Patient/Family Education  Outcome: Progressing Towards Goal     Problem: Pain  Goal: *Control of Pain  Outcome: Progressing Towards Goal

## 2022-02-09 NOTE — PROGRESS NOTES
Occupational Therapy  02/09/22    Orders received, chart reviewed and patient evaluated by occupational therapy. Pending progression with skilled acute occupational therapy, recommend:  Outpatient occupational therapy follow up recommended for R visual deficits     Recommend with nursing ADLs with supervision/setup, OOB to chair 3x/day and toileting via functional mobility to and from bathroom with supervision. Thank you for completing as able in order to maintain patient strength, endurance and independence. Full evaluation to follow.      Thank you,   Va Chappell, OTD, OTR/L

## 2022-02-09 NOTE — CONSULTS
Consult request received. 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 Bilateral posterior cerebral arteries are diseased with intracranial atherosclerosis, left greater than right. This constellation of lesions is not expected to be flow-limiting. However vessel to vessel thromboembolic events from these plaques is a possibility. No endovascular target for stenting.   [facility]. Start taking pantoprazole daily, if no improvement in 1-2 weeks please call for GI referral  Can take carafate 4 times daily before meals to help with stomach pains

## 2022-02-09 NOTE — CONSULTS
[address]    Name:  Silas Banuelos  MR#:  [id number]  :  1965  ACCOUNT #:  [de-identified]  DATE OF SERVICE:  2022    REQUESTING PHYSICIAN:  Ramesh Garnica MD    REASON FOR EVALUATION:  Vision loss. HISTORY OF PRESENT ILLNESS:  The patient is a 73-year-old male with past medical history of diabetes, hypertension, who has not been on any medications as he has been taking a dietary and holistic approach towards his conditions. About a week and half ago, he woke up one day and noticed that he could not see very well on the right side. He saw his ophthalmologist who advised him to go to the hospital as he suspected a stroke. His vision has not improved over the past several days. Denies any problems with strength, coordination, balance or sensation. No difficulty with speech, swallowing, or headaches. No chest pain, shortness of breath, palpitations, nausea or vomiting. PAST MEDICAL HISTORY:  As mentioned above. ALLERGIES:  TREE NUTS. SOCIAL HISTORY:  Patient has no history of smoking, alcohol or drug use. 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 FAMILY HISTORY:  Noncontributory. PHYSICAL EXAMINATION:  GENERAL:  The patient is alert, fully oriented. VITAL SIGNS:  Blood pressure 175/98, temperature 98, pulse is 78. HEART:  Regular rate and rhythm. CHEST:  Clear. ABDOMEN:  Soft, nontender. Positive bowel sounds. EXTREMITIES:  No edema. NEUROLOGIC:  Speech is clear. Comprehension is normal.  Pupils are equal, round and reactive. Visual field examination reveals right-sided homonymous hemianopsia. Face is symmetric. Hearing is baseline. Muscle tone and bulk normal.  Strength normal in both upper and lower extremities. DTRs 2/2 and symmetric. Toes downgoing. Sensation intact. Gait baseline. LABORATORY DATA:  CBC is normal.  Chemistry, sodium 134, potassium is 3.8, BUN 6, creatinine 0.62.   Lipid profile, triglycerides 84, HDL 49, LDL 147.2. CTA of the head and neck showed mild to moderate atherosclerotic vascular disease in the vertebrobasilar system. No significant extracranial stenosis. Hypodensity noted in the left occipital lobe consistent with subacute ischemia. 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 This has caused right-sided homonymous hemianopsia, i.e., visual field deficit. Continue with aspirin, statin and importance of treating the underlying risk factors was discussed. Follow up on MRI and echocardiogram.  Please call with any questions. Thank you for this consultation.       Srikanth Causey MD      [id number]  D:  02/09/2022 13:53  T:  02/09/2022 15:52  JOB #:  [id number]

## 2022-02-09 NOTE — PROGRESS NOTES
Physical Therapy:     Orders received and chart reviewed. Discussed with OT who just evaluated patient. Patient has no mobility deficits and is at his functional baseline. Does present with visual impairments with OP OT recommended. No PT needs at this time, will sign off.       Tiarra Rodriguez, PT, DPT

## 2022-02-09 NOTE — ED NOTES
Received bed assignment report called to dejan ohward awaiting ambulance transport to [place] [id number].

## 2022-02-09 NOTE — CONSULTS
Consult dictated. 79-year-old with untreated hypertension, diabetes dyslipidemia presenting with a left occipital infarct related to vertebrobasilar atherosclerotic vascular disease. This has caused right-sided homonymous hemianopsia. Continue aspirin, statin and treatment of underlying risk factors.   Follow-up on Juan Garzon MD

## 2022-02-09 NOTE — PROGRESS NOTES
Transition of Care Plan: Home with outpatient OT    RUR: 6%    PCP F/U: No PCP. Requesting PCP list    Disposition: Home    Transportation: Spouse    Main Contact: Lorena Degroot [phone]: Met with patient at the bedside. A&O x 4. Confirmed demographics. Patient states that he lives with spouse and their children in a two story home. No issues with stairs. No DME use. Independent with ADLs. No history of HH or IPR/SNF stays. Uses [facility]. States likely wife would transport home. Patient does not have PCP and is requesting a list. Will provide. OT recommending outpatient OT. Will need rx script for outpatient OT upon discharge. Will provide therapy locations near home. Felicia Pérez RN, CRM      Reason for Admission:  Subacute ischemia left occipital lobe ischemia. RUR Score:   6% low                  Plan for utilizing home health:      None indicated    PCP: First and Last name:  None     Name of Practice:    Are you a current patient: Yes/No:    Approximate date of last visit:    Can you participate in a virtual visit with your PCP:                     Current Advanced Directive/Advance Care Plan: Full Code      Healthcare Decision Maker:   Click here to complete [address] including selection of the [address] Relationship (ie \"Primary\")   Spouse- Iain PAEZ-[phone]                          Transition of Care Plan:   Home with outpatient OT                   Care Management Interventions  PCP Verified by CM: No  Mode of Transport at Discharge:  Other (see comment) (Family)  Physical Therapy Consult: Yes  Occupational Therapy Consult: Yes  Support Systems: Spouse/Significant Other,Child(brenda)  Confirm Follow Up Transport: Family  Discharge Location  Patient Expects to be Discharged to[de-identified] Home

## 2022-02-09 NOTE — PROGRESS NOTES
OCCUPATIONAL THERAPY EVALUATION/DISCHARGE  Patient: Jong Landrum (89 y.o. male)  Date: 2/9/2022  Primary Diagnosis: Stroke (cerebrum) ([facility] [address] [I63.9]       Precautions: SBP <140       ASSESSMENT  Based on the objective data described below, the patient presents with loss of R peripheral vision s/p admission for CVA--CT+ for subacute L occipital ischemia, MRI pending. He is IND at baseline, living with family, and reports blurred vision in L eye x2 years. He now presents near his functional activity baseline, IND-SPV with mobility & ADLs ROM/strength/coordination intact, only deficit is loss of R periphery. Educated on compnsatory techniques for visual scanning to ensure safe environmental management and injury prevention, patient acknowledging understanding. Recommend d/c home with family support and OP OT for neuro vision. Current Level of Function (ADLs/self-care): IND-SPV for ADLs and mobility    Functional Outcome Measure: The patient scored Total A-D  Total A-D (Motor Function): 66/66 on the Fugl-Gonzales Assessment which is indicative of no impairment in upper extremity functional status. Other factors to consider for discharge: none     PLAN :  Recommendation for discharge: (in order for the patient to meet his/her long term goals)  Outpatient occupational therapy follow up recommended for R visual deficits    This discharge recommendation:  Has been made in collaboration with the attending provider and/or case management    IF patient discharges home will need the following DME: none       SUBJECTIVE:   Patient stated Well I had trouble with the left eye and it just so happened that all of this occurred right around that appointment, it's crazy.     OBJECTIVE DATA SUMMARY:   HISTORY:   Past Medical History:   Diagnosis Date    Diabetes ([facility] [address]     Hypertension     Psoriasis    History reviewed. No pertinent surgical history.     Prior Level of Function/Environment/Context:   Expanded or extensive additional review of patient history:     Home Situation  Home Environment: Private residence  # Steps to Enter: 1  Rails to [email or web address]: No  One/Two Story Residence: Two story  # of Interior Steps: 12  Interior Rails: Left (going up)  Living Alone: No  Support Systems: Spouse/Significant Other,Child(brenda)  Patient Expects to be Discharged to[de-identified] Home  Current DME Used/Available at Home: Grab bars  Tub or Shower Type: Tub/Shower combination    Hand dominance: Right    EXAMINATION OF PERFORMANCE DEFICITS:  Cognitive/Behavioral Status:  Neurologic State: Alert  Orientation Level: Oriented X4  Cognition: Appropriate for age attention/concentration; Follows commands  Perception: Appears intact  Perseveration: No perseveration noted  Safety/Judgement: Decreased awareness of environment; Fall prevention    Skin: Appears intact, global psoriasis     Edema: None    Hearing: Auditory  Auditory Impairment: None    Vision/Perceptual:    Tracking: Able to track stimulus in all quadrants w/o difficulty; Requires cues, head turns, or add eye shifts to track (increased strain on R, minimal periphery R>L)              Visual Fields: Difficulty detecting stimulus  in right lateral quadrant; Difficulty detecting stimulus in right lower quadrant; Difficulty detecting stimulus in right upper quadrant (no periphery in all R VFs, decreased L periphery )  Diplopia: No    Acuity:  (blurred vision in L eye--reports has been for 2 years)    Corrective Lenses: Reading glasses    Range of Motion:  AROM: Within functional limits  PROM: Within functional limits                      Strength:  Strength: Within functional limits                Coordination:  Coordination: Within functional limits  Fine Motor Skills-Upper: Left Intact; Right Intact    Gross Motor Skills-Upper: Left Intact; Right Intact    Tone & Sensation:  Tone: Normal  Sensation: Intact                      Balance:  Sitting: Intact  Standing: Intact; Without support    Functional Mobility and Transfers for ADLs:  Bed Mobility:  Supine to Sit: Independent  Sit to Supine: Independent  Scooting: Independent    Transfers:  Sit to Stand: Independent  Stand to Sit: Independent  Bathroom Mobility: Independent  Toilet Transfer : Independent    ADL Assessment:  Feeding: Independent    Oral Facial Hygiene/Grooming: Independent    Bathing: Supervision    Upper Body Dressing: Independent    Lower Body Dressing: Independent    Toileting: Supervision                ADL Intervention and task modifications:     Lower Body Dressing Assistance  Dressing Assistance: Independent  Pants With Button/Zipper: Independent  Socks: Independent  Leg Crossed Method Used: Yes  Position Performed: Seated edge of bed;Standing    Toileting  Toileting Assistance: Supervision (simulated in bathroom)  Bladder Hygiene: Independent  Bowel Hygiene: Independent  Clothing Management: Supervision    Cognitive Retraining  Safety/Judgement: Decreased awareness of environment; Fall prevention    Patient instructed and indicated understanding home modifications (ie raise height of ADL objects, appropriate chair heights, recliner safety), safety (ie change of floor surfaces, clear pathways), to increase independence and fall prevention with visual scanning nisa to the R side to maximize environmental awareness, patient acknowledging understanding.        Functional Measure:  Fugl-Gonzales Assessment of Motor Recovery after Stroke:   Reflex Activity  Flexors/Biceps/Fingers: Can be elicited  Extensors/Triceps: Can be elicited  Reflex Subtotal: 4    Volitional Movement Within Synergies  Shoulder Retraction: Full  Shoulder Elevation: Full  Shoulder Abduction (90 degrees): Full  Shoulder External Rotation: Full  Elbow Flexion: Full  Forearm Supination: Full  Shoulder Adduction/Internal Rotation: Full  Elbow Extension: Full  Forearm Pronation: Full  Subtotal: 18    Volitional Movement Mixing Synergies  Hand to Lumbar Spine: Full  Shoulder Flexion (0-90 degrees): Full  Pronation-Supination: Full  Subtotal: 6    Volitional Movement With Little or No Synergy  Shoulder Abduction (0-90 degrees): Full  Shoulder Flexion ( degrees): Full  Pronation/Supination: Full  Subtotal : 6    Normal Reflex Activity  Biceps, Triceps, Finger Flexors: Full  Subtotal : 2    Upper Extremity Total   Upper Extremity Total: 36    Wrist  Stability at 15 Degree Dorsiflexion: Full  Repeated Dorsiflexion/ Volar Flexion: Full  Stability at 15 Degree Dorsiflexion: Full  Repeated Dorsiflexion/ Volar Flexion: Full  Circumduction: Full  Wrist Total: 10    Hand  Mass Flexion: Full  Mass Extension: Full  Grasp A: Full  Grasp B: Full  Grasp C: Full  Grasp D: Full  Grasp E: Full  Hand Total: 14    Coordination/Speed  Tremor: None  Dysmetria: None  Time: <1s  Coordination/Speed Total : 6    Total A-D  Total A-D (Motor Function): 66/66     This is a reliable/valid measure of arm function after a neurological event. It has established value to characterize functional status and for measuring spontaneous and therapy-induced recovery; tests proximal and distal motor functions. Fugl-Gonzales Assessment - UE scores recorded between five and 30 days post neurologic event can be used to predict UE recovery at six months post neurologic event. Severe = 0-21 points   Moderately Severe = 22-33 points   Moderate = 34-47 points   Mild = 48-66 points  JEAN Vasquez, LAUREN Bear, & ALEKSEY Lim (1992). Measurement of motor recovery after stroke: Outcome assessment and sample size requirements.  Stroke, 23, pp. 8038-1955.   ------------------------------------------------------------------------------------------------------------------------------------------------------------------  MCID:  Stroke:   Vicente Alcantara et al, 2001; n = 171; mean age 79 (6) years; assessed within 16 (12) days of stroke, Acute Stroke)  FMA Motor Scores from Admission to Discharge   10 point increase in FMA Upper Extremity = 1.5 change in discharge FIM   10 point increase in FMA Lower Extremity = 1.9 change in discharge FIM  MDC:   Stroke:   Paola Sureshar et al, 2008, n = 14, mean age = 59.9 (14.6) years, assessed on average 14 (6.5) months post stroke, Chronic Stroke)   FMA = 5.2 points for the Upper Extremity portion of the assessment       Barthel Index:  Bathin  Bladder: 10  Bowels: 10  Groomin  Dressing: 10  Feeding: 10  Mobility: 15  Stairs: 10  Toilet Use: 10  Transfer (Bed to Chair and Back): 10  Total: 95/100      The Barthel ADL Index: Guidelines  1. The index should be used as a record of what a patient does, not as a record of what a patient could do. 2. The main aim is to establish degree of independence from any help, physical or verbal, however minor and for whatever reason. 3. The need for supervision renders the patient not independent. 4. A patient's performance should be established using the best available evidence. Asking the patient, friends/relatives and nurses are the usual sources, but direct observation and common sense are also important. However direct testing is not needed. 5. Usually the patient's performance over the preceding 24-48 hours is important, but occasionally longer periods will be relevant. 6. Middle categories imply that the patient supplies over 50 per cent of the effort. 7. Use of aids to be independent is allowed. Score Interpretation (from [address] [facility] [address])    Independent   60-79 Minimally independent   40-59 Partially dependent   20-39 Very dependent   <20 Totally dependent     -Tomas Nowak., Barthel, D.W. (1965). Functional evaluation: the Barthel Index. [address] [facility] ([address] (1997). The Barthel activities of daily living index: self-reporting versus actual performance in the old (> or = 75 years). Journal of [address] [place] [address] 45(7), [facility], J.J.M.F, Jerry Shaffer., Marysol Zuniga. (1999).  Measuring the change in disability after inpatient rehabilitation; comparison of the responsiveness of the Barthel Index and Functional [place] Measure. Journal of Neurology, Neurosurgery, and Psychiatry, 664), 962-305. ALISON Wong, RANDALL Paredes, & No Saini M.A. (2004) Assessment of post-stroke quality of life in cost-effectiveness studies: The usefulness of the Barthel Index and the EuroQoL-5D. Quality of Life Research, 15, 811-91         Occupational Therapy Evaluation Charge Determination   History Examination Decision-Making   LOW Complexity : Brief history review  LOW Complexity : 1-3 performance deficits relating to physical, cognitive , or psychosocial skils that result in activity limitations and / or participation restrictions  LOW Complexity : No comorbidities that affect functional and no verbal or physical assistance needed to complete eval tasks       Based on the above components, the patient evaluation is determined to be of the following complexity level: LOW   Pain Rating:  None    Activity Tolerance:   WNL, elevated BP    After treatment patient left in no apparent distress:    Supine in bed, Call bell within reach and Side rails x 3    COMMUNICATION/EDUCATION:   The patients plan of care was discussed with: Physical therapist, Registered nurse and Case management. Patient was educated regarding his deficit(s) of R visual loss as this relates to his diagnosis of CVA. He demonstrated Good understanding as evidenced by verbal discussion & carryover. Patient and/or family was verbally educated on the BE FAST acronym for signs/symptoms of CVA and TIA. BE FAST was written on patient's communication board  for visual education and reinforcement. All questions answered with patient indicating good understanding.      Thank you for this referral.  MALIK Maddox, OTR/L  Time Calculation: 27 mins

## 2022-02-09 NOTE — PROGRESS NOTES
[id number] Paged hospitalist to make aware of patient's arrival    [phone] Re-Paged hospitalist. No call back
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
## 2022-02-10 ENCOUNTER — APPOINTMENT (OUTPATIENT)
Dept: NON INVASIVE DIAGNOSTICS | Age: 57
DRG: 045 | End: 2022-02-10
Attending: HOSPITALIST
Payer: COMMERCIAL

## 2022-02-10 VITALS
WEIGHT: 154.1 LBS | BODY MASS INDEX: 24.19 KG/M2 | DIASTOLIC BLOOD PRESSURE: 93 MMHG | RESPIRATION RATE: 16 BRPM | TEMPERATURE: 98 F | OXYGEN SATURATION: 98 % | HEIGHT: 67 IN | HEART RATE: 88 BPM | SYSTOLIC BLOOD PRESSURE: 158 MMHG

## 2022-02-10 LAB
GLUCOSE BLD STRIP.AUTO-MCNC: 241 MG/DL (ref 65–117)
GLUCOSE BLD STRIP.AUTO-MCNC: 329 MG/DL (ref 65–117)
GLUCOSE BLD STRIP.AUTO-MCNC: 349 MG/DL (ref 65–117)
SERVICE CMNT-IMP: ABNORMAL

## 2022-02-10 PROCEDURE — 99233 SBSQ HOSP IP/OBS HIGH 50: CPT | Performed by: CLINICAL NURSE SPECIALIST

## 2022-02-10 PROCEDURE — 82962 GLUCOSE BLOOD TEST: CPT

## 2022-02-10 PROCEDURE — 74011636637 HC RX REV CODE- 636/637: Performed by: HOSPITALIST

## 2022-02-10 PROCEDURE — 74011250637 HC RX REV CODE- 250/637: Performed by: NURSE PRACTITIONER

## 2022-02-10 PROCEDURE — 99356 PR PROLONGED SVC I/P OR OBS SETTING 1ST HOUR: CPT | Performed by: CLINICAL NURSE SPECIALIST

## 2022-02-10 PROCEDURE — 74011250637 HC RX REV CODE- 250/637: Performed by: HOSPITALIST

## 2022-02-10 RX ORDER — GUAIFENESIN 100 MG/5ML
81 LIQUID (ML) ORAL DAILY
Qty: 30 TABLET | Refills: 1 | Status: SHIPPED | OUTPATIENT
Start: 2022-02-11

## 2022-02-10 RX ORDER — INSULIN GLARGINE 100 [IU]/ML
15 INJECTION, SOLUTION SUBCUTANEOUS DAILY
Status: DISCONTINUED | OUTPATIENT
Start: 2022-02-11 | End: 2022-02-10 | Stop reason: HOSPADM

## 2022-02-10 RX ORDER — INSULIN PUMP SYRINGE, 3 ML
EACH MISCELLANEOUS
Qty: 1 KIT | Refills: 0 | Status: SHIPPED | OUTPATIENT
Start: 2022-02-10

## 2022-02-10 RX ORDER — ATORVASTATIN CALCIUM 80 MG/1
80 TABLET, FILM COATED ORAL DAILY
Qty: 30 TABLET | Refills: 1 | Status: SHIPPED | OUTPATIENT
Start: 2022-02-11 | End: 2022-05-10 | Stop reason: SDUPTHER

## 2022-02-10 RX ORDER — METFORMIN HYDROCHLORIDE 500 MG/1
1 TABLET, FILM COATED, EXTENDED RELEASE ORAL DAILY
Qty: 60 TABLET | Refills: 0 | Status: SHIPPED | OUTPATIENT
Start: 2022-02-10 | End: 2022-05-10 | Stop reason: SDUPTHER

## 2022-02-10 RX ORDER — INSULIN GLARGINE 100 [IU]/ML
INJECTION, SOLUTION SUBCUTANEOUS
Qty: 1 ML | Refills: 1 | Status: SHIPPED | OUTPATIENT
Start: 2022-02-11 | End: 2022-02-10

## 2022-02-10 RX ORDER — INSULIN GLARGINE 100 [IU]/ML
13 INJECTION, SOLUTION SUBCUTANEOUS
Qty: 1 PEN | Refills: 1 | Status: SHIPPED | OUTPATIENT
Start: 2022-02-10

## 2022-02-10 RX ORDER — INSULIN GLARGINE 100 [IU]/ML
10 INJECTION, SOLUTION SUBCUTANEOUS DAILY
Status: DISCONTINUED | OUTPATIENT
Start: 2022-02-10 | End: 2022-02-10

## 2022-02-10 RX ORDER — AMLODIPINE BESYLATE 5 MG/1
5 TABLET ORAL DAILY
Qty: 30 TABLET | Refills: 1 | Status: SHIPPED | OUTPATIENT
Start: 2022-02-11 | End: 2022-05-10 | Stop reason: SDUPTHER

## 2022-02-10 RX ORDER — METFORMIN HYDROCHLORIDE 500 MG/1
500 TABLET ORAL 2 TIMES DAILY WITH MEALS
Qty: 60 TABLET | Refills: 1 | Status: SHIPPED | OUTPATIENT
Start: 2022-02-10 | End: 2022-02-10

## 2022-02-10 RX ORDER — BLOOD SUGAR DIAGNOSTIC
STRIP MISCELLANEOUS
Qty: 100 PEN NEEDLE | Refills: 0 | Status: SHIPPED | OUTPATIENT
Start: 2022-02-10

## 2022-02-10 RX ADMIN — ASPIRIN 81 MG 81 MG: 81 TABLET ORAL at 08:37

## 2022-02-10 RX ADMIN — INSULIN GLARGINE 10 UNITS: 100 INJECTION, SOLUTION SUBCUTANEOUS at 08:37

## 2022-02-10 RX ADMIN — Medication 3 UNITS: at 08:38

## 2022-02-10 RX ADMIN — Medication 7 UNITS: at 11:49

## 2022-02-10 RX ADMIN — AMLODIPINE BESYLATE 5 MG: 5 TABLET ORAL at 08:37

## 2022-02-10 RX ADMIN — ATORVASTATIN CALCIUM 80 MG: 40 TABLET, FILM COATED ORAL at 08:37

## 2022-02-10 RX ADMIN — Medication 7 UNITS: at 17:45

## 2022-02-10 NOTE — DIABETES MGMT
[address] [facility]    CLINICAL NURSE SPECIALIST CONSULT     Initial Presentation   Chanda Cleveland is a 49 y.o. male admitted 2/8/22 with c/o vision loss. Reports no insurance - was trying lifestyle modifications to manage DM2/ and HTN. Recently follow up with an ophthalmologist 2/9/22-no peripheral vision noted in bilateral eyes and was told to come to hospital for evaluation of stroke. Admitting lab [address] POC glucose [address]. Ct head showed subacute ischemia left occipital lobs/ CTA -Subacute ischemia left occipital lobe ischemia /  No large vessel occlusion is identified/moderate stenosis of the basilar artery and irregularity and possible stenosis of the left P1 segment. HX:   Past Medical History:   Diagnosis Date    Diabetes ([facility] [address]     Hypertension     Psoriasis         INITIAL DX:   Stroke (cerebrum) ([facility] [address] [I63.9]     Current Treatment     TX: MRI/ lipid profile/ ASA/statin/ BP management/ECHO    Consulted by Provider for advanced diabetes nursing assessment and care for:     [x] Inpatient management strategy  [x] Home management assessment  [x] Survival skill education    Hospital Course   Clinical progress has been complicated by subacute ischemic stroke in setting of uncontrolled DM2 and HTN. Diabetes History   Verbalized he's been diabetic for 10 yrs and now realizes \"he was not managing it well\".  A1C 12.0%, -took self off Metformin years ago due to \"terrible cramping/diarrhea\"-     Diabetes-related Medical History  Acute complications  persistent hyperglycemia  Neurological complications  Peripheral neuropathy-verbalizes numbness/tingling  Microvascular disease  Retinopathy??  Recently saw opthamologist   Macrovascular disease  Cerebral vascular accident  Other associated conditions     HTN    Diabetes Medication History-NEW MEDICATIONS PRESCRIBED THIS VISIT  Key Antihyperglycemic Medications               insulin glargine (LANTUS) 100 unit/mL injection Starting on 2/11/2022. 13 unit subcut inj qhs    metFORMIN (GLUCOPHAGE) 500 mg tablet (Taking) Take 1 Tablet by mouth two (2) times daily (with meals). Diabetes self-management practices:   Eating pattern-controls his diet   [x] Eating a carbohydrate-controlled mealplan  [x] Breakfast 4 eggs/ 6slices of barclay/ 1 cup grits/ 1 daily glazed donut  [x] Lunch  SKIPS  [x] Dinner  Likes to eat out/ wife cooks meals- veggie/starch/protein  [x] Snacks 2 glazed donuts   [x] Beverages 6-12oz regular cokes daily  Physical activity pattern   [x] Employing a physical activity program to control BG  [x] Aerobic exercise Walks 2miles /day     Monitoring pattern-does not check     Taking medications pattern-NONE    Social determinants of health impacting diabetes self-management practices   Concerned that you need to know more about how to stay healthy with diabetes  Overall evaluation:    [x] NOT Achieving A1c target despite diet management and physical exercise    Subjective   Discussed diet/ and past history of managing his diabetes    WAs on Metformin years ago and took self off due to GI upset (cramping/ diarrhea)  Thought exercising and only eating 2 meals a day would 'control diabetes and blood pressure'     He is a RN-now unemployed   w/ 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 Conversant and cooperative  Neuro  Alert, oriented   Vital Signs   Visit Vitals  BP (!) 159/89   Pulse 90   Temp 98.5 °F (36.9 °C)   Resp 16   Ht 5' 7\" (1.702 m)   Wt 69.9 kg (154 lb 1.6 oz)   SpO2 98%   BMI 24.14 kg/m²     Skin  Warm and dry. Heart   Regular rate and rhythm.  No murmurs, rubs or gallops  Lungs  Clear to auscultation without rales or rhonchi  Extremities No foot wounds    Diabetic foot exam:    Left Foot     Visual Exam: normal    Pulse DP: 2+ (normal)   Filament test: reduced sensation      Right Foot   Visual Exam: normal    Pulse DP: 2+ (normal)   Filament test: reduced sensation     DP & PT pulses +2. Laboratory  Recent Labs     02/09/22  1034 02/08/22  1524   * 362*   AGAP 6 12   TRIGL 84  --    WBC 4.2 5.3   CREA 0.62* 0.69*   GFRNA >60 >60   AST 21 27   ALT 37 40       Factors impacting BG management  Factor Dose Comments   Nutrition:  Standard meals     60 grams/meal      Other:   Kidney function  Liver function     WNL  WNL      Blood glucose pattern      Significant diabetes-related events over the past 24-72 hours  Admitting A1C 12.0%  BG on admit-  342  2/10/22: - started on basal and correction today. Required 18 units of correction on 2/9/22. Assessment and Plan   Nursing Diagnosis Risk for unstable blood glucose pattern   Nursing Intervention Domain 5250 Decision-making Support   Nursing Interventions Examined current inpatient diabetes/blood glucose control   Explored factors facilitating and impeding inpatient management  Explored corrective strategies with patient and responsible inpatient provider   Informed patient of rational for insulin strategy while hospitalized     Nursing Diagnosis 42271 Ineffective Health Management   Nursing Intervention Domain 5250 Decision-makingSupport   Nursing Interventions Identified diabetes self-management practices impeding diabetes control  Discussed diabetes survival skills related to  1. Healthy Plate eating plan; given handouts (hyper/hypoglycemia/ A1C)  2. Role of physical activity in improving insulin sensitivity and action  3. Procedure for blood glucose monitoring & options for low-cost products available from [facility]   4. Medications plan at discharge to include daily insulin and Metformin ER. Evaluation   This is a 50yo AA male admitted with CVA ischemic. Uncontrolled hypertension/ and uncontrolled diabetes noted at admission. Patient had been trying to manage both diseases without meds/ daily exercise.   After speaking with him re: his diet, he was not managing his intake of sweets (donuts/cokes daily). Discussed CVA and LE numbness/tingling directly related to uncontrolled DM and BP management. Slated for discharge today s/p ECHO. Provided education on diet modifications/ checking blood glucose/ - will instruct on insulin administration closet to discharge today. This patient would benefit from diabetes self-management education and support KEVIN SLATER [facility]) after discharge. Recommendations    1. [x] Use of Subcutaneous Insulin Order set (1229)  Insulin Dosing Specific recommendation   CONTINUE Basal                                      (Based on weight, BMI & GFR) 10 units daily    CONTINUE Corrective                                       (Useful in adjusting insulin dosing) [x] Normal sensitivity        2. Case management getting PCP appointment for follow up  Discharge Planning   1. Prescription for glucometer kit (Meter, Lancets (100), Strips (100)). Patient to obtain a blood glucose reading four times daily. First thing in the morning prior to eating and drinking anything then before lunch, dinner and bedtime. Create a log and present to PCP for interpretation    2. Metformin  mg: Start with 500 mg Daily; monitor for GI side effects. Side effects should resolve after 5-7 days. If no GI side effects- advance dose by 1 tablet every 5-7 days to a total dose of 1000 mg BID. 3.On Discharge, please place an outpatient order for \"diabetes education\" (enter as Jennifer Garrett). This will trigger a referral for the [facility] which includes outpatient diabetes self management training with a certified diabetes educator. 4. PCP follow up - Case management making an appointment.     Billing Code(s)   [id number]    Before making these care recommendations, I personally reviewed the hospitalization record, including notes, laboratory & diagnostic data and current medications, and examined the patient at the bedside (circumstances permitting) before making care recommendations. More than fifty (50) percent of the time was spent in patient counseling and/or care coordination.   Total minutes:  [address] RajeshALYSA Rascon  Diabetes Clinical Nurse Specialist  [facility]  Access via [address] [place] [address]

## 2022-02-10 NOTE — DISCHARGE SUMMARY
Discharge Summary       PATIENT ID: Clark Mendoza  MRN: [id number]   YOB: 1965    DATE OF ADMISSION: 2/8/2022  3:08 PM    DATE OF DISCHARGE: 2/10/2022   PRIMARY CARE PROVIDER: None     ATTENDING PHYSICIAN: Dr Lois Nagel  DISCHARGING PROVIDER: Lois Nagel MD    To contact this individual call [phone] and ask the  to page. If unavailable ask to be transferred the Adult Hospitalist Department. CONSULTATIONS: IP CONSULT TO HOSPITALIST  IP CONSULT TO NEUROLOGY  IP CONSULT TO NEUROINTERVENTIONAL SURGERY    PROCEDURES/SURGERIES: * No surgery found *    DISCHARGE DIAGNOSES:   # Subacute left occipital stroke  -Hypodensity in the left occipital lobe most consistent with subacute ischemia. There is moderate stenosis of the basilar artery and irregularity and possible stenosis of the left P1 segment.   -MRI brain 2/9 Acute/subacute left medial occipital lobe cortical infarct  -echo awaited  -LDL elevated  -on ASA/Statin  -Appreciate Neurology, awaiting echo     # Hypertension:on Norvasc  # DM type 2 new diagnosis: Lantus. Will also discharge on metformin with outpatient follow up with PMD       ADDITIONAL CARE RECOMMENDATIONS:   Follow up with PMD  Follow up with Neurology     NOTIFY [facility] FOLLOWING:   Fever over 101 degrees for 24 hours. Chest pain, shortness of breath, fever, chills, nausea, vomiting, diarrhea, change in mentation, falling, weakness, bleeding. Severe pain or pain not relieved by medications, as well as any other signs or symptoms that you may have questions about.     FOLLOW UP APPOINTMENTS:    Follow-up Information     Follow up With Specialties Details Why Contact Info    PMD  In 1 week      Kenisha Leon MD Neurology In 4 weeks  [address] [place] [address]  [phone]               DIET: Diabetic Diet    ACTIVITY: Activity as tolerated      DISCHARGE MEDICATIONS:  Current Discharge Medication List      START taking these medications    Details   amLODIPine (NORVASC) 5 mg tablet Take 1 Tablet by mouth daily. Qty: 30 Tablet, Refills: 1  Start date: 2/11/2022      aspirin 81 mg chewable tablet Take 1 Tablet by mouth daily. Qty: 30 Tablet, Refills: 1  Start date: 2/11/2022      atorvastatin (LIPITOR) 80 mg tablet Take 1 Tablet by mouth daily. Qty: 30 Tablet, Refills: 1  Start date: 2/11/2022      insulin glargine (LANTUS) 100 unit/mL injection 13 unit subcut inj qhs  Qty: 1 mL, Refills: 1  Start date: 2/11/2022      metFORMIN (GLUCOPHAGE) 500 mg tablet Take 1 Tablet by mouth two (2) times daily (with meals).   Qty: 60 Tablet, Refills: 1  Start date: 2/10/2022             DISPOSITION:   x Home With:   OT  PT  HH  RN       Long term SNF/Inpatient Rehab    Independent/assisted living    Hospice    Other:       PATIENT CONDITION AT DISCHARGE:     Functional status    Poor     Deconditioned    x Independent      Cognition    x Lucid     Forgetful     Dementia      Catheters/lines (plus indication)    Vargas     PICC     PEG    x None      Code status   x  Full code     DNR      PHYSICAL EXAMINATION AT DISCHARGE:  Please see progress note    CHRONIC MEDICAL DIAGNOSES:  Problem List as of 2/10/2022 Date Reviewed: 2/10/2022          Codes Class Noted - Resolved    * (Principal) Stroke (cerebrum) ([facility] [address] ICD-10-CM: I63.9  ICD-9-CM: 434.91  2/8/2022 - Present              Greater than 37 minutes were spent with the patient on counseling and coordination of care    Signed:   Anika Back MD  2/10/2022  8:53 AM   .

## 2022-02-10 NOTE — PROGRESS NOTES
[facility]                                                                                          Hospitalist Progress Note  Milly Coreas MD  Answering service: [phone] OR 36 from in house phone        Date of Service:  2/10/2022  NAME:  Gideon Calderon  :  1965  MRN:  [id number]      Admission Summary:   Gideon Calderon is a 49 y.o. male with past medical history of diabetes, hypertension-states that he is not on any medications(states that he does not have insurance, also told me that he wanted to try lifestyle changes without medications) he came to the hospital with a chief complaint of vision loss.     Patient states that he had vision abnormalities for few years, he had problems in his left eye and recently for the last 1 week noticed blurry vision in the right eye. He has seen an ophthalmologist yesterday-he was told that he does not have peripheral vision in both eyes, he recommended evaluation in the hospital for stroke.     Patient states that he was diagnosed with hypertension maybe 15- 20 years ago. He denied any chest pain, nausea/vomiting, abdominal pain, diarrhea, shortness of breath and other constitutional symptoms.   He denied any weakness, numbness/tingling.     States that he took 2 doses of COVID-19 vaccine and he is due for booster       Interval history / Subjective:   F/u CVA     Assessment & Plan:     # Subacute left occipital stroke  -Hypodensity in the left occipital lobe most consistent with subacute ischemia. There is moderate stenosis of the basilar artery and irregularity and possible stenosis of the left P1 segment.   -MRI brain  Acute/subacute left medial occipital lobe cortical infarct  -echo awaited  -LDL elevated  -on ASA/Statin  -Appreciate Neurology, awaiting echo     # Hypertension:on Norvasc  # DM type 2: Lantus/SSI     Regular diet     Code status: FULL CODE  Prophylaxis: scd    Plan: Awaiting Echo, if stable, will discharge the patient today  Care Plan discussed with: Patient  Anticipated Disposition: home     Hospital Problems  Never Reviewed          Codes Class Noted POA    Stroke (cerebrum) [facility]) ICD-10-CM: I63.9  ICD-9-CM: 434.91  2/8/2022 Unknown                Review of Systems:   A comprehensive review of systems was negative except for that written in the HPI. Vital Signs:    Last 24hrs VS reviewed since prior progress note. Most recent are:  Visit Vitals  BP (!) 150/90 (BP 1 Location: Right upper arm)   Pulse 80   Temp 98.1 °F (36.7 °C)   Resp 15   Ht 5' 7\" (1.702 m)   Wt 69.9 kg (154 lb 1.6 oz)   SpO2 98%   BMI 24.14 kg/m²       No intake or output data in the 24 hours ending 02/10/22 0739     Physical Examination:     I had a face to face encounter with this patient and independently examined them on 2/10/2022 as outlined below:          Constitutional:  No acute distress   ENT:  Oral mucosa moist, oropharynx benign. Resp:  CTA bilaterally. No wheezing/rhonchi/rales. No accessory muscle use. CV:  Regular rhythm, normal rate, no murmurs, gallops, rubs    GI:  Soft, non distended, non tender. normoactive bowel sounds, no hepatosplenomegaly     Musculoskeletal:  No edema, warm, 2+ pulses throughout    Neurologic:  Moves all extremities. AAOx3, CN II-XII reviewed            Data Review:    Review and/or order of clinical lab test      Labs:     Recent Labs     02/09/22  1034 02/08/22  1524   WBC 4.2 5.3   HGB 15.2 16.6   HCT 45.4 48.5    168     Recent Labs     02/09/22  1034 02/08/22  1524   * 137   K 3.8 4.2    99   CO2 26 26   BUN 6 9   CREA 0.62* 0.69*   * 362*   CA 8.7 9.2     Recent Labs     02/09/22  1034 02/08/22  1524   ALT 37 40   AP 69 76   TBILI 0.8 0.6   TP 6.6 7.7   ALB 3.3* 3.8   GLOB 3.3 3.9     Recent Labs     02/08/22  1524   INR 1.0   PTP 9.9      No results for input(s): FE, TIBC, PSAT, FERR in the last 72 hours.    No results found for: FOL, RBCF   No results for input(s): PH, PCO2, PO2 in the last 72 hours. No results for input(s): CPK, CKNDX, TROIQ in the last 72 hours.     No lab exists for component: CPKMB  Lab Results   Component Value Date/Time    Cholesterol, total 213 (H) 02/09/2022 10:34 AM    HDL Cholesterol 49 02/09/2022 10:34 AM    LDL, calculated 147.2 (H) 02/09/2022 10:34 AM    Triglyceride 84 02/09/2022 10:34 AM    CHOL/HDL Ratio 4.3 02/09/2022 10:34 AM     Lab Results   Component Value Date/Time    Glucose (POC) 241 (H) 02/10/2022 07:21 AM    Glucose (POC) 339 (H) 02/09/2022 10:42 PM    Glucose (POC) 313 (H) 02/09/2022 04:59 PM    Glucose (POC) 327 (H) 02/09/2022 11:17 AM    Glucose (POC) 342 (H) 02/08/2022 03:10 PM     No results found for: COLOR, APPRN, SPGRU, REFSG, HERVE, PROTU, GLUCU, KETU, BILU, UROU, LISA, LEUKU, GLUKE, EPSU, BACTU, WBCU, RBCU, CASTS, UCRY      Medications Reviewed:     Current Facility-Administered Medications   Medication Dose Route Frequency    insulin glargine (LANTUS) injection 10 Units  10 Units SubCUTAneous DAILY    insulin lispro (HUMALOG) injection   SubCUTAneous AC&HS    glucose chewable tablet 16 g  4 Tablet Oral PRN    glucagon (GLUCAGEN) injection 1 mg  1 mg IntraMUSCular PRN    aspirin chewable tablet 81 mg  81 mg Oral DAILY    amLODIPine (NORVASC) tablet 5 mg  5 mg Oral DAILY    dextrose 10 % infusion 125-250 mL  125-250 mL IntraVENous PRN    atorvastatin (LIPITOR) tablet 80 mg  80 mg Oral DAILY     ______________________________________________________________________  EXPECTED LENGTH OF STAY: 2d 0h  ACTUAL LENGTH OF STAY:          2                 Josephus Mcardle, MD

## 2022-02-10 NOTE — PROGRESS NOTES
1530: Bedside and Verbal shift change report given to Patrick Lyles RN (oncoming nurse) by Keri Dowell RN (offgoing nurse). Report included the following information SBAR, Kardex, Intake/Output, MAR, Recent Results, Med Rec Status, Cardiac Rhythm SR and Dual Neuro Assessment. 1545: Non-invasive paged at [address] and notified of patient's ordered Echo pending discharge. 1700: Pending echo escalated to Nursing Supervisor. 1730: Dr. Hi Marin informed of patient wanting to leave hospital despite pending Echo. 1814: I have reviewed discharge instructions with the patient. The patient verbalized understanding. PIV and telemetry discontinued. Patient understands he has a pending echocardiogram ordered upon discharge. Per Dr. Hi Marin, patient educated to establish a PCP and schedule an outpatient echocardiogram upon discharge. Patient discharged to home via family.          Stroke Education documented in Patient Education: YES  Core Measures Documented in Connect [facility]: YES  Risk Factors: YES  Warning signs of stroke: YES  When to Activate 911: YES  Medication Education for Risk Factors: YES  Smoking cessation if applicable: N/A  Written Education Given:  YES    Discharge NIH Completed: YES  Score: 1    BRAINS: YES    Follow Up Appointment Made: YES  Date/Time if applicable: 7/52/59 at 39:62UZ

## 2022-02-10 NOTE — PROGRESS NOTES
Transition of Care Plan: Home with outpatient OT     RUR: 6%     PCP F/U: Mi Leong MD on 02/16/2022 at 10:30am     Disposition: Home     Transportation: Spouse     Main Contact: Spouse- Claudio Giles ROROH-[phone]     1304: Met with Diabetes RN. States she spoke with patient about getting him an appointment scheduled at [facility] and would like this CM to set that up. However, location unavailable.  Appointment set up to see Dr Taylor Adler on 02/16/2022 at 10:30am at [address] Silverio Gigi, RN, [address]

## 2022-02-10 NOTE — DISCHARGE INSTRUCTIONS
Discharge Instructions       PATIENT ID: Eddie Muro  MRN: [id number]   YOB: 1965    DATE OF ADMISSION: 2/8/2022  3:08 PM    DATE OF DISCHARGE: 2/10/2022    PRIMARY CARE PROVIDER: None     ATTENDING PHYSICIAN: Venkata Diaz MD  DISCHARGING PROVIDER: Trisha Blake MD    To contact this individual call [phone] and ask the  to page. If unavailable ask to be transferred the Adult Hospitalist Department. DISCHARGE DIAGNOSES   Stroke    CONSULTATIONS: IP CONSULT TO HOSPITALIST  IP CONSULT TO NEUROLOGY  IP CONSULT TO NEUROINTERVENTIONAL SURGERY    PROCEDURES/SURGERIES: * No surgery found *    PENDING TEST RESULTS:   At the time of discharge the following test results are still pending: none    FOLLOW UP APPOINTMENTS:   Follow-up Information     Follow up With Specialties Details Why Contact Info    PMD  In 1 week      Cee Snyder MD Neurology In 4 weeks  [place] [id number] [facility]  [phone]             ADDITIONAL CARE RECOMMENDATIONS:   Follow up with PMD  Follow up with Neurology    DIET: Diabetic Diet    ACTIVITY: Activity as tolerated    DISCHARGE MEDICATIONS:   See Medication Reconciliation Form    · It is important that you take the medication exactly as they are prescribed. · Keep your medication in the bottles provided by the pharmacist and keep a list of the medication names, dosages, and times to be taken in your wallet. · Do not take other medications without consulting your doctor. NOTIFY YOUR PHYSICIAN FOR ANY OF THE FOLLOWING:   Fever over 101 degrees for 24 hours. Chest pain, shortness of breath, fever, chills, nausea, vomiting, diarrhea, change in mentation, falling, weakness, bleeding. Severe pain or pain not relieved by medications. Or, any other signs or symptoms that you may have questions about.       DISPOSITION:  x  Home With:   OT  PT  HH  RN       SNF/Inpatient Rehab/LTAC    Independent/assisted living    Hospice    Other: CDMP Checked:   Yes x     PROBLEM LIST Updated:  Yes x       Signed:   Belkys Scruggs MD  2/10/2022       Diabetes Management:  1. Take a blood glucose reading four times daily:  First thing in the morning before you eat or drink anything. Then before lunch, dinner and bedtime. Make a log and bring to your next doctor appointment. If your blood sugar is over 200 consistently OR   If your blood sugar is under 100 consistently: call your doctor for a medication adjustment   Goal blood sugar is: _80-130 (ideal) before meals, shoot for BG <200.      2. Take your diabetes medications as prescribed . Goal A1C is 7%. 3. Make a follow up appointment with your endocrinologist or primary care physician. Please see him within the next 2-4 weeks. 4. Make sure to get a DILATED eye exam every year. This checks for retinal blood vessel damage caused by long term high blood sugar. 5. Make sure to examine your feet every day looking for any wound or foot irritation as sensation can be impaired in your feet. Always wear socks and/or slippers even while at home. This will protect your feet from injury. 6. Diabetes Self Management Training:(highly encouraged)   Outpatient appointments are available with a certified diabetic educator who can  you with meal planning, insulin administration and other diabetes management strategies. Please call [phone] to schedule at a location close to your home.   8:52 AM

## 2022-02-10 NOTE — PROGRESS NOTES
Bedside shift change report given to Curtis Alexandre RN (oncoming nurse) by Ousmane Walsh RN (offgoing nurse). Report included the following information SBAR, Cardiac Rhythm Sinus Rhythm and Dual Neuro Assessment.

## 2022-03-16 ENCOUNTER — OFFICE VISIT (OUTPATIENT)
Dept: INTERNAL MEDICINE CLINIC | Age: 57
End: 2022-03-16
Payer: COMMERCIAL

## 2022-03-16 VITALS
OXYGEN SATURATION: 98 % | RESPIRATION RATE: 16 BRPM | DIASTOLIC BLOOD PRESSURE: 78 MMHG | SYSTOLIC BLOOD PRESSURE: 126 MMHG | BODY MASS INDEX: 23.7 KG/M2 | HEART RATE: 90 BPM | TEMPERATURE: 98 F | WEIGHT: 151 LBS | HEIGHT: 67 IN

## 2022-03-16 DIAGNOSIS — Z76.89 ENCOUNTER TO ESTABLISH CARE WITH NEW DOCTOR: ICD-10-CM

## 2022-03-16 DIAGNOSIS — E78.5 HYPERLIPIDEMIA, UNSPECIFIED HYPERLIPIDEMIA TYPE: ICD-10-CM

## 2022-03-16 DIAGNOSIS — Z79.4 TYPE 2 DIABETES MELLITUS WITH OTHER OPHTHALMIC COMPLICATION, WITH LONG-TERM CURRENT USE OF INSULIN (HCC): Primary | ICD-10-CM

## 2022-03-16 DIAGNOSIS — I10 ESSENTIAL HYPERTENSION: ICD-10-CM

## 2022-03-16 DIAGNOSIS — E11.39 TYPE 2 DIABETES MELLITUS WITH OTHER OPHTHALMIC COMPLICATION, WITH LONG-TERM CURRENT USE OF INSULIN (HCC): Primary | ICD-10-CM

## 2022-03-16 DIAGNOSIS — Z86.73 HISTORY OF CVA (CEREBROVASCULAR ACCIDENT): ICD-10-CM

## 2022-03-16 DIAGNOSIS — Z00.00 HEALTHCARE MAINTENANCE: ICD-10-CM

## 2022-03-16 LAB
CREAT UR-MCNC: 151 MG/DL
HIV 1+2 AB+HIV1 P24 AG SERPL QL IA: NONREACTIVE
HIV12 RESULT COMMENT, HHIVC: NORMAL
MICROALBUMIN UR-MCNC: 5.37 MG/DL
MICROALBUMIN/CREAT UR-RTO: 36 MG/G (ref 0–30)
PSA SERPL-MCNC: 1.3 NG/ML (ref 0.01–4)

## 2022-03-16 PROCEDURE — 99204 OFFICE O/P NEW MOD 45 MIN: CPT | Performed by: INTERNAL MEDICINE

## 2022-03-16 RX ORDER — VALSARTAN 40 MG/1
40 TABLET ORAL DAILY
Qty: 30 TABLET | Refills: 4 | Status: SHIPPED | OUTPATIENT
Start: 2022-03-16

## 2022-03-16 NOTE — PATIENT INSTRUCTIONS
Type 2 Diabetes: Care Instructions  Your Care Instructions     Type 2 diabetes is a disease that develops when the body's tissues cannot use insulin properly. Over time, the pancreas cannot make enough insulin. Insulin is a hormone that helps the body's cells use sugar (glucose) for energy. It also helps the body store extra sugar in muscle, fat, and liver cells. Without insulin, the sugar cannot get into the cells to do its work. It stays in the blood instead. This can cause high blood sugar levels. A person has diabetes when the blood sugar stays too high too much of the time. Over time, diabetes can lead to diseases of the heart, blood vessels, nerves, kidneys, and eyes. You may be able to control your blood sugar by losing weight, eating a healthy diet, and getting daily exercise. You may also have to take insulin or other diabetes medicine. Follow-up care is a key part of your treatment and safety. Be sure to make and go to all appointments. Call your doctor if you are having problems. It's also a good idea to know your test results and keep a list of the medicines you take. How can you care for yourself at home? · Keep your blood sugar at a target level (which you set with your doctor). ? Carbohydrate--the body's main source of fuel--affects blood sugar more than any other nutrient. Carbohydrate is in fruits, vegetables, milk, and yogurt. It also is in breads, cereals, vegetables such as potatoes and corn, and sugary foods such as candy and cakes. Follow your meal plan to know how much carbohydrate to eat at each meal and snack. ? Aim for 30 minutes of exercise on most, preferably all, days of the week. Walking is a good choice. You also may want to do other activities, such as running, swimming, cycling, or playing tennis or team sports. Try to do muscle-strengthening exercises at least 2 times a week. ? Take your medicines exactly as prescribed.  Call your doctor if you think you are having a problem with your medicine. You will get more details on the specific medicines your doctor prescribes. · Check your blood sugar as often as your doctor recommends. It is important to keep track of any symptoms you have, such as low blood sugar. Also tell your doctor if you have any changes in your activities, diet, or insulin use. · Talk to your doctor before you start taking aspirin every day. Aspirin can help certain people lower their risk of a heart attack or stroke. But taking aspirin isn't right for everyone, because it can cause serious bleeding. · Do not smoke. If you need help quitting, talk to your doctor about stop-smoking programs and medicines. These can increase your chances of quitting for good. · Keep your cholesterol and blood pressure at normal levels. You may need to take one or more medicines to reach your goals. Take them exactly as directed. Do not stop or change a medicine without talking to your doctor first.  When should you call for help? Call 911 anytime you think you may need emergency care. For example, call if:    · You passed out (lost consciousness), or you suddenly become very sleepy or confused. (You may have very low blood sugar.)   Call your doctor now or seek immediate medical care if:    · Your blood sugar is 300 mg/dL or is higher than the level your doctor has set for you.     · You have symptoms of low blood sugar, such as:  ? Sweating. ? Feeling nervous, shaky, and weak. ? Extreme hunger and slight nausea. ? Dizziness and headache.  ? Blurred vision. ? Confusion. Watch closely for changes in your health, and be sure to contact your doctor if:    · You often have problems controlling your blood sugar.     · You have symptoms of long-term diabetes problems, such as:  ? New vision changes. ? New pain, numbness, or tingling in your hands or feet. ? Skin problems. Where can you learn more?   Go to http://www.gray.com/  Enter C553 in the search box to learn more about \"Type 2 Diabetes: Care Instructions. \"  Current as of: July 28, 2021               Content Version: 13.2  © 2006-2022 [employer]. Care instructions adapted under license by [employer] (which disclaims liability or warranty for this information). If you have questions about a medical condition or this instruction, always ask your healthcare professional. [place] [address] any warranty or liability for your use of this information.

## 2022-03-16 NOTE — PROGRESS NOTES
Office Visit Note:    Assessment/Plan:  1. Type 2 diabetes mellitus with other ophthalmic complication, with long-term current use of insulin ([facility] [address]    2. History of CVA (cerebrovascular accident)    3. Essential hypertension    4. Hyperlipidemia, unspecified hyperlipidemia type    5. Encounter to establish care with new doctor    6. Healthcare maintenance      1. Diabetes mellitus type 2, insulin-dependent-she apparently has vision issues from diabetes and follows up with an ophthalmologist.  His last A1c was 12 on 2/9/2022. He states that sugars are running very high now. We will increase his Lantus from 13 units to 20 units. I told him that we want to try to get most of his sugars below 200. I have advised him to increase his Lantus by 2 units every 3 to 4 days until most of his sugars are in the low 200s or below 200. We will ask him to follow-up with blood sugar log in 1 month. He is on a high intensity statin. I will also add Diovan on him and check a urine for microalbumin, diabetic foot exam is negative. He may benefit from diabetic education, will discuss this again next visit  2. History of CVA in the recent past-he was apparently recommended to see a neurologist on discharge but apparently he did not, will make the referral to the neurologist who saw him in the hospital.  On aspirin and statin  3. Hypertension blood pressure controlled here but he tells me his home blood pressure readings are much higher. He is on Norvasc, will add lisinopril  4. Hyperlipidemia-on high intensity statin, will plan to repeat his lipid panel with his next set of labs      Health Maintenance: Will discuss health maintenance in detail in next visit  Immunizations:  Tetanus: Last   Shingles:   Influenza:  Covid:    Screening:  Colon cancer screening: Referred to GI for colonoscopy  Prostate cancer: screening risks and benefits discussed, will check PSA.   Hepatitis C:  HIV:  Diabetes:  Depression: PHQ-2 negative  Exercise:  Diet:  Sleep:      Orders Placed This Encounter    MICROALBUMIN, UR, RAND W/ MICROALB/CREAT RATIO     Standing Status:   Future     Standing Expiration Date:   3/16/2023    PSA SCREENING (SCREENING)     Standing Status:   Future     Standing Expiration Date:   3/16/2023    HCV AB W/RFLX TO BRISA     Standing Status:   Future     Standing Expiration Date:   3/16/2023    HIV 1/2 AG/AB, 4TH GENERATION,W RFLX CONFIRM     Standing Status:   Future     Standing Expiration Date:   3/16/2023    REFERRAL TO NEUROLOGY     Referral Priority:   Routine     Referral Type:   Consultation     Referral Reason:   Specialty Services Required     Referred to Provider:   Damaris Monterroso MD     Requested Specialty:   Neurology     Number of Visits Requested:   1    REFERRAL TO GASTROENTEROLOGY     Referral Priority:   Routine     Referral Type:   Consultation     Referral Reason:   Specialty Services Required     Referred to Provider:   Héctor Miles MD     Requested Specialty:   Gastroenterology     Number of Visits Requested:   1    valsartan (DIOVAN) 40 mg tablet     Sig: Take 1 Tablet by mouth daily. Dispense:  30 Tablet     Refill:  4     Social Determinants of Health     Tobacco Use: Unknown    Smoking Tobacco Use: Never Smoker    Smokeless Tobacco Use: Unknown   Alcohol Use:     Frequency of Alcohol Consumption: Not on file    Average Number of Drinks: Not on file    Frequency of Binge Drinking: Not on file   Financial Resource Strain:     Difficulty of Paying Living Expenses: Not on file   Food Insecurity:     Worried About Running Out of Food in the Last Year: Not on file    Arabella of Food in the Last Year: Not on file   Transportation Needs:     Lack of Transportation (Medical): Not on file    Lack of Transportation (Non-Medical):  Not on file   Physical Activity:     Days of Exercise per Week: Not on file    Minutes of Exercise per Session: Not on file   Stress:     Feeling of Stress : Not on file   Social Connections:     Frequency of Communication with Friends and Family: Not on file    Frequency of Social Gatherings with Friends and Family: Not on file    Attends Latter-day Services: Not on file    Active Member of Clubs or Organizations: Not on file    Attends Club or Organization Meetings: Not on file    Marital Status: Not on file   Intimate Partner Violence:     Fear of Current or Ex-Partner: Not on file    Emotionally Abused: Not on file    Physically Abused: Not on file    Sexually Abused: Not on file   Depression: Not at risk    PHQ-2 Score: 0   Housing Stability:     Unable to Pay for Housing in the Last Year: Not on file    Number of [place] in the Last Year: Not on file    Unstable Housing in the Last Year: Not on file       Follow-up and Dispositions    · Return in about 1 month (around 4/16/2022). I have reviewed with the patient details of the assessment and plan and all questions were answered. Relevant patient education was performed. The most recent lab findings were reviewed with the patient. An After Visit Summary was printed and given to the patient. Reason for Visit: [address] [facility] Follow Up, Diabetes, and Hypertension      Subjective:  49 y.o. male with h/o recent CVA, diabetes mellitus type 2, hypertension who comes to establish with me as a primary care physician. He was recently in the hospital when he was incidentally found to have a stroke. Apparently his ophthalmologist found something in his eye and an MRI was done and which showed that he had a stroke. Apparently he was seen by a neurologist in the hospital and he was put on aspirin and Lipitor. He states that he is feeling at his baseline and never had any symptoms of stroke. He states that he has not seen a primary care doctor for years now since he did not have insurance.   He states that sugars are running in the 300s now, denies any hypoglycemic episodes  Denies any lightheadedness dizziness nausea any vomiting. Review of Systems  A complete 11 system ROS was preformed (constitutional, eyes, ENT, cardiovascular, respiratory, gastrointestinal, genitourinary, musculoskeletal, skin, neurological, psychiatric) and was negative aside from the pertinent positives and negatives noted in the HPI. Past Medical History:   Diagnosis Date    Diabetes ([address]     Hypertension     Psoriasis      History reviewed. No pertinent surgical history. Social History     Socioeconomic History    Marital status:    Tobacco Use    Smoking status: Never Smoker   Substance and Sexual Activity    Alcohol use: Never    Drug use: Never     History reviewed. No pertinent family history. Current Outpatient Medications   Medication Sig Dispense Refill    valsartan (DIOVAN) 40 mg tablet Take 1 Tablet by mouth daily. 30 Tablet 4    amLODIPine (NORVASC) 5 mg tablet Take 1 Tablet by mouth daily. 30 Tablet 1    aspirin 81 mg chewable tablet Take 1 Tablet by mouth daily. 30 Tablet 1    atorvastatin (LIPITOR) 80 mg tablet Take 1 Tablet by mouth daily. 30 Tablet 1    Blood-Glucose Meter monitoring kit Check blood glucose three times daily BEFORE meals and at bedtime 1 Kit 0    insulin glargine (Lantus Solostar U-100 Insulin) 100 unit/mL (3 mL) inpn 13 Units by SubCUTAneous route nightly. Inject prescribed amount daily- 1 Pen 1    Insulin Needles, Disposable, (Comfort EZ Pen Needles) 32 gauge x 1/4\" ndle A new needle to be used with each insulin injection 100 Pen Needle 0    metFORMIN (GLUMETZA ER) 500 mg TG24 24 hour tablet Take 500 mg by mouth daily. Take Metformin at dinner time daily with a meal 60 Tablet 0     Allergies   Allergen Reactions    Tree Nut Nausea and Vomiting       Objective:  Visit Vitals  /78   Pulse 90   Temp 98 °F (36.7 °C) (Oral)   Resp 16   Ht 5' 7\" (1.702 m)   Wt 151 lb (68.5 kg)   SpO2 98%   BMI 23.65 kg/m²     Physical Exam:   AA&O x3. Not pale, not in any distress. HEENT: ENT negative. Neck: Supple, no JVD or lymphadenopathy. Lungs: clear  Heart: S1 S2 +, RRR  Abdomen: Soft, No tenderness  Neuro: No focal deficits. Skin: No erythema or lesions noted. Extremities: no pedal edema, good peripheral pulses  Psych: Normal affect and mood. Results for orders placed or performed during the hospital encounter of 02/08/22   COVID-19 RAPID TEST   Result Value Ref Range    Specimen source Nasopharyngeal      COVID-19 rapid test Not detected NOTD     CBC WITH AUTOMATED DIFF   Result Value Ref Range    WBC 5.3 4.1 - 11.1 K/uL    RBC 5.72 (H) 4.10 - 5.70 M/uL    HGB 16.6 12.1 - 17.0 g/dL    HCT 48.5 36.6 - 50.3 %    MCV 84.8 80.0 - 99.0 FL    MCH 29.0 26.0 - 34.0 PG    MCHC 34.2 30.0 - 36.5 g/dL    RDW 12.6 11.5 - 14.5 %    PLATELET 530 840 - 062 K/uL    MPV 9.1 8.9 - 12.9 FL    NRBC 0.0 0  WBC    ABSOLUTE NRBC 0.00 0.00 - 0.01 K/uL    NEUTROPHILS 52 32 - 75 %    LYMPHOCYTES 32 12 - 49 %    MONOCYTES 13 5 - 13 %    EOSINOPHILS 1 0 - 7 %    BASOPHILS 1 0 - 1 %    IMMATURE GRANULOCYTES 1 (H) 0.0 - 0.5 %    ABS. NEUTROPHILS 2.8 1.8 - 8.0 K/UL    ABS. LYMPHOCYTES 1.7 0.8 - 3.5 K/UL    ABS. MONOCYTES 0.7 0.0 - 1.0 K/UL    ABS. EOSINOPHILS 0.1 0.0 - 0.4 K/UL    ABS. BASOPHILS 0.0 0.0 - 0.1 K/UL    ABS. IMM. GRANS. 0.0 0.00 - 0.04 K/UL    DF AUTOMATED     METABOLIC PANEL, COMPREHENSIVE   Result Value Ref Range    Sodium 137 136 - 145 mmol/L    Potassium 4.2 3.5 - 5.1 mmol/L    Chloride 99 97 - 108 mmol/L    CO2 26 21 - 32 mmol/L    Anion gap 12 5 - 15 mmol/L    Glucose 362 (H) 65 - 100 mg/dL    BUN 9 6 - 20 MG/DL    Creatinine 0.69 (L) 0.70 - 1.30 MG/DL    BUN/Creatinine ratio 13 12 - 20      GFR est AA >60 >60 ml/min/1.73m2    GFR est non-AA >60 >60 ml/min/1.73m2    Calcium 9.2 8.5 - 10.1 MG/DL    Bilirubin, total 0.6 0.2 - 1.0 MG/DL    ALT (SGPT) 40 12 - 78 U/L    AST (SGOT) 27 15 - 37 U/L    Alk.  phosphatase 76 45 - 117 U/L    Protein, total 7.7 6.4 - 8.2 g/dL    Albumin 3.8 3.5 - 5.0 g/dL    Globulin 3.9 2.0 - 4.0 g/dL    A-G Ratio 1.0 (L) 1.1 - 2.2     PROTHROMBIN TIME + INR   Result Value Ref Range    INR 1.0 0.9 - 1.1      Prothrombin time 9.9 9.0 - 91.3 sec   METABOLIC PANEL, COMPREHENSIVE   Result Value Ref Range    Sodium 134 (L) 136 - 145 mmol/L    Potassium 3.8 3.5 - 5.1 mmol/L    Chloride 102 97 - 108 mmol/L    CO2 26 21 - 32 mmol/L    Anion gap 6 5 - 15 mmol/L    Glucose 326 (H) 65 - 100 mg/dL    BUN 6 6 - 20 MG/DL    Creatinine 0.62 (L) 0.70 - 1.30 MG/DL    BUN/Creatinine ratio 10 (L) 12 - 20      GFR est AA >60 >60 ml/min/1.73m2    GFR est non-AA >60 >60 ml/min/1.73m2    Calcium 8.7 8.5 - 10.1 MG/DL    Bilirubin, total 0.8 0.2 - 1.0 MG/DL    ALT (SGPT) 37 12 - 78 U/L    AST (SGOT) 21 15 - 37 U/L    Alk. phosphatase 69 45 - 117 U/L    Protein, total 6.6 6.4 - 8.2 g/dL    Albumin 3.3 (L) 3.5 - 5.0 g/dL    Globulin 3.3 2.0 - 4.0 g/dL    A-G Ratio 1.0 (L) 1.1 - 2.2     CBC WITH AUTOMATED DIFF   Result Value Ref Range    WBC 4.2 4.1 - 11.1 K/uL    RBC 5.30 4. 10 - 5.70 M/uL    HGB 15.2 12.1 - 17.0 g/dL    HCT 45.4 36.6 - 50.3 %    MCV 85.7 80.0 - 99.0 FL    MCH 28.7 26.0 - 34.0 PG    MCHC 33.5 30.0 - 36.5 g/dL    RDW 12.3 11.5 - 14.5 %    PLATELET 736 940 - 665 K/uL    MPV 8.8 (L) 8.9 - 12.9 FL    NRBC 0.0 0  WBC    ABSOLUTE NRBC 0.00 0.00 - 0.01 K/uL    NEUTROPHILS 52 32 - 75 %    LYMPHOCYTES 33 12 - 49 %    MONOCYTES 12 5 - 13 %    EOSINOPHILS 2 0 - 7 %    BASOPHILS 1 0 - 1 %    IMMATURE GRANULOCYTES 0 0.0 - 0.5 %    ABS. NEUTROPHILS 2.1 1.8 - 8.0 K/UL    ABS. LYMPHOCYTES 1.4 0.8 - 3.5 K/UL    ABS. MONOCYTES 0.5 0.0 - 1.0 K/UL    ABS. EOSINOPHILS 0.1 0.0 - 0.4 K/UL    ABS. BASOPHILS 0.0 0.0 - 0.1 K/UL    ABS. IMM.  GRANS. 0.0 0.00 - 0.04 K/UL    DF AUTOMATED     HEMOGLOBIN A1C WITH EAG   Result Value Ref Range    Hemoglobin A1c 12.0 (H) 4.0 - 5.6 %    Est. average glucose 298 mg/dL   LIPID PANEL   Result Value Ref Range    Cholesterol, total 213 (H) <200 MG/DL    Triglyceride 84 <150 MG/DL    HDL Cholesterol 49 MG/DL    LDL, calculated 147.2 (H) 0 - 100 MG/DL    VLDL, calculated 16.8 MG/DL    CHOL/HDL Ratio 4.3 0.0 - 5.0     GLUCOSE, POC   Result Value Ref Range    Glucose (POC) 342 (H) 65 - 117 mg/dL    Performed by Darcy Centeno (Trvlr)    GLUCOSE, POC   Result Value Ref Range    Glucose (POC) 327 (H) 65 - 117 mg/dL    Performed by Jaclyn Childers    GLUCOSE, POC   Result Value Ref Range    Glucose (POC) 313 (H) 65 - 117 mg/dL    Performed by Jaclyn Childers    GLUCOSE, POC   Result Value Ref Range    Glucose (POC) 339 (H) 65 - 117 mg/dL    Performed by Kesha Oswald    GLUCOSE, POC   Result Value Ref Range    Glucose (POC) 241 (H) 65 - 117 mg/dL    Performed by Kesha Oswald    GLUCOSE, POC   Result Value Ref Range    Glucose (POC) 349 (H) 65 - 117 mg/dL    Performed by NIKKO Sin   Float Pool    GLUCOSE, POC   Result Value Ref Range    Glucose (POC) 329 (H) 65 - 117 mg/dL    Performed by Maykel Bueno    EKG, 12 LEAD, INITIAL   Result Value Ref Range    Ventricular Rate 83 BPM    Atrial Rate 83 BPM    P-R Interval 224 ms    QRS Duration 104 ms    Q-T Interval 412 ms    QTC Calculation (Bezet) 484 ms    Calculated P Axis 47 degrees    Calculated R Axis 14 degrees    Calculated T Axis -18 degrees    Diagnosis       Baseline artifact  Sinus rhythm with 1st degree AV block  Left atrial enlargement  Left ventricular hypertrophy  Prolonged QT  No previous ECGs available  Confirmed by Chris Valera ([id number]) on 2/9/2022 9:53:07 AM           Sandy Moore MD, FACP, [facility].   [address]

## 2022-03-16 NOTE — PROGRESS NOTES
1. Have you been to the ER, urgent care clinic since your last visit? Hospitalized since your last visit? YES/[facility]    2. Have you seen or consulted any other health care providers outside of the [address] [place] [address] [facility] since your last visit? Include any pap smears or colon screening. no    3 most recent PHQ Screens 3/16/2022   Little interest or pleasure in doing things Not at all   Feeling down, depressed, irritable, or hopeless Not at all   Total Score PHQ 2 0     .   Chief Complaint   Patient presents with   [facility] Follow Up

## 2022-03-17 LAB
HCV AB S/CO SERPL IA: <0.1 S/CO RATIO (ref 0–0.9)
HCV AB SERPL QL IA: NORMAL

## 2022-03-19 PROBLEM — I63.9 STROKE (CEREBRUM) (HCC): Status: ACTIVE | Noted: 2022-02-08

## 2022-05-10 RX ORDER — AMLODIPINE BESYLATE 5 MG/1
5 TABLET ORAL DAILY
Qty: 30 TABLET | Refills: 0 | Status: SHIPPED | OUTPATIENT
Start: 2022-05-10 | End: 2022-08-19 | Stop reason: SDUPTHER

## 2022-05-10 RX ORDER — METFORMIN HYDROCHLORIDE 500 MG/1
1 TABLET, FILM COATED, EXTENDED RELEASE ORAL DAILY
Qty: 60 TABLET | Refills: 0 | Status: SHIPPED | OUTPATIENT
Start: 2022-05-10

## 2022-05-10 RX ORDER — ATORVASTATIN CALCIUM 80 MG/1
80 TABLET, FILM COATED ORAL DAILY
Qty: 30 TABLET | Refills: 0 | Status: SHIPPED | OUTPATIENT
Start: 2022-05-10 | End: 2022-08-19 | Stop reason: SDUPTHER

## 2022-05-10 NOTE — TELEPHONE ENCOUNTER
Last visit 03/16/2022 MD Jennifer Nascimento   Next appointment No show 04/29/2022 - Nothing rescheduled   Previous refill encounter(s)   02/10/2022 Metformin ER #60     Requested Prescriptions     Pending Prescriptions Disp Refills    metFORMIN (GLUMETZA ER) 500 mg TG24 24 hour tablet 60 Tablet 0     Sig: Take 500 mg by mouth daily.  Take Metformin at dinner time daily with a meal

## 2022-05-10 NOTE — TELEPHONE ENCOUNTER
Last visit 03/16/2022 MD Ceferino Hines   Next appointment No show 04/29/2022 - Nothing rescheduled   Previous refill encounter(s)   02/11/2022:  - Norvasc #30 with 1 refill,  - Lipitor #30 with 1 refill. Requested Prescriptions     Pending Prescriptions Disp Refills    amLODIPine (NORVASC) 5 mg tablet 30 Tablet 0     Sig: Take 1 Tablet by mouth daily.  atorvastatin (LIPITOR) 80 mg tablet 30 Tablet 0     Sig: Take 1 Tablet by mouth daily.

## 2022-08-19 RX ORDER — AMLODIPINE BESYLATE 5 MG/1
5 TABLET ORAL DAILY
Qty: 90 TABLET | Refills: 0 | Status: SHIPPED | OUTPATIENT
Start: 2022-08-19

## 2022-08-19 RX ORDER — ATORVASTATIN CALCIUM 80 MG/1
80 TABLET, FILM COATED ORAL DAILY
Qty: 90 TABLET | Refills: 0 | Status: SHIPPED | OUTPATIENT
Start: 2022-08-19

## 2022-08-19 NOTE — TELEPHONE ENCOUNTER
Last visit 03/16/2022 MD Kenisha Estevez   Next appointment Nothing scheduled  Previous refill encounter(s)   05/10/2022 [facility] #30     For Pharmacy Admin Tracking Only    Intervention Detail: New Rx: 1, reason: Patient Preference  Time Spent (min): 10        Requested Prescriptions     Pending Prescriptions Disp Refills    amLODIPine (NORVASC) 5 mg tablet 90 Tablet 0     Sig: Take 1 Tablet by mouth daily.

## 2022-08-19 NOTE — TELEPHONE ENCOUNTER
Last visit 03/16/2022 MD Sommers Postin   Next appointment Nothing scheduled  Previous refill encounter(s)   05/10/2022 Lipitor #30     For Pharmacy Admin Tracking Only    Intervention Detail: New Rx: 1, reason: Patient Preference  Time Spent (min): 5        Requested Prescriptions     Pending Prescriptions Disp Refills    atorvastatin (LIPITOR) 80 mg tablet 90 Tablet 0     Sig: Take 1 Tablet by mouth daily.

## 2024-01-09 ENCOUNTER — APPOINTMENT (OUTPATIENT)
Facility: HOSPITAL | Age: 59
DRG: 720 | End: 2024-01-09
Payer: COMMERCIAL

## 2024-01-09 ENCOUNTER — HOSPITAL ENCOUNTER (INPATIENT)
Facility: HOSPITAL | Age: 59
LOS: 7 days | Discharge: HOME | DRG: 720 | End: 2024-01-16
Attending: STUDENT IN AN ORGANIZED HEALTH CARE EDUCATION/TRAINING PROGRAM | Admitting: FAMILY MEDICINE
Payer: COMMERCIAL

## 2024-01-09 DIAGNOSIS — I63.239 CEREBROVASCULAR ACCIDENT (CVA) DUE TO STENOSIS OF CAROTID ARTERY, UNSPECIFIED BLOOD VESSEL LATERALITY (HCC): ICD-10-CM

## 2024-01-09 DIAGNOSIS — G93.40 ENCEPHALOPATHY: ICD-10-CM

## 2024-01-09 DIAGNOSIS — E08.10 DIABETIC KETOACIDOSIS WITHOUT COMA ASSOCIATED WITH DIABETES MELLITUS DUE TO UNDERLYING CONDITION (HCC): ICD-10-CM

## 2024-01-09 DIAGNOSIS — J10.1 INFLUENZA A: ICD-10-CM

## 2024-01-09 DIAGNOSIS — J06.9 UPPER RESPIRATORY TRACT INFECTION, UNSPECIFIED TYPE: ICD-10-CM

## 2024-01-09 DIAGNOSIS — R11.2 NAUSEA AND VOMITING, UNSPECIFIED VOMITING TYPE: Primary | ICD-10-CM

## 2024-01-09 DIAGNOSIS — R41.82 ALTERED MENTAL STATUS, UNSPECIFIED ALTERED MENTAL STATUS TYPE: ICD-10-CM

## 2024-01-09 DIAGNOSIS — R00.0 TACHYCARDIA: ICD-10-CM

## 2024-01-09 PROBLEM — J11.1 FLU SYNDROME: Status: ACTIVE | Noted: 2024-01-09

## 2024-01-09 PROBLEM — E11.10 DKA, TYPE 2, NOT AT GOAL (HCC): Status: ACTIVE | Noted: 2024-01-09

## 2024-01-09 PROBLEM — E11.65 POORLY CONTROLLED DIABETES MELLITUS (HCC): Status: ACTIVE | Noted: 2024-01-09

## 2024-01-09 LAB
ALBUMIN SERPL-MCNC: 3.8 G/DL (ref 3.5–5)
ALBUMIN/GLOB SERPL: 0.7 (ref 1.1–2.2)
ALP SERPL-CCNC: 88 U/L (ref 45–117)
ALT SERPL-CCNC: 30 U/L (ref 12–78)
ANION GAP SERPL CALC-SCNC: 10 MMOL/L (ref 5–15)
ANION GAP SERPL CALC-SCNC: 21 MMOL/L (ref 5–15)
ANION GAP SERPL CALC-SCNC: 3 MMOL/L (ref 5–15)
APPEARANCE UR: CLEAR
AST SERPL-CCNC: 24 U/L (ref 15–37)
BACTERIA URNS QL MICRO: ABNORMAL /HPF
BASOPHILS # BLD: 0 K/UL (ref 0–0.1)
BASOPHILS NFR BLD: 0 % (ref 0–1)
BILIRUB SERPL-MCNC: 0.6 MG/DL (ref 0.2–1)
BILIRUB UR QL: NEGATIVE
BUN SERPL-MCNC: 13 MG/DL (ref 6–20)
BUN SERPL-MCNC: 19 MG/DL (ref 6–20)
BUN SERPL-MCNC: 24 MG/DL (ref 6–20)
BUN/CREAT SERPL: 13 (ref 12–20)
BUN/CREAT SERPL: 17 (ref 12–20)
BUN/CREAT SERPL: 19 (ref 12–20)
CALCIUM SERPL-MCNC: 8.7 MG/DL (ref 8.5–10.1)
CALCIUM SERPL-MCNC: 8.8 MG/DL (ref 8.5–10.1)
CALCIUM SERPL-MCNC: 9.7 MG/DL (ref 8.5–10.1)
CHLORIDE SERPL-SCNC: 110 MMOL/L (ref 97–108)
CHLORIDE SERPL-SCNC: 120 MMOL/L (ref 97–108)
CHLORIDE SERPL-SCNC: 122 MMOL/L (ref 97–108)
CO2 SERPL-SCNC: 10 MMOL/L (ref 21–32)
CO2 SERPL-SCNC: 17 MMOL/L (ref 21–32)
CO2 SERPL-SCNC: 17 MMOL/L (ref 21–32)
COLOR UR: ABNORMAL
COMMENT:: NORMAL
CREAT SERPL-MCNC: 1 MG/DL (ref 0.7–1.3)
CREAT SERPL-MCNC: 1.02 MG/DL (ref 0.7–1.3)
CREAT SERPL-MCNC: 1.4 MG/DL (ref 0.7–1.3)
DIFFERENTIAL METHOD BLD: ABNORMAL
EOSINOPHIL # BLD: 0 K/UL (ref 0–0.4)
EOSINOPHIL NFR BLD: 0 % (ref 0–7)
EPITH CASTS URNS QL MICRO: ABNORMAL /LPF
ERYTHROCYTE [DISTWIDTH] IN BLOOD BY AUTOMATED COUNT: 14.1 % (ref 11.5–14.5)
EST. AVERAGE GLUCOSE BLD GHB EST-MCNC: 318 MG/DL
FLUAV AG NPH QL IA: POSITIVE
FLUBV AG NOSE QL IA: NEGATIVE
GLOBULIN SER CALC-MCNC: 5.3 G/DL (ref 2–4)
GLUCOSE BLD STRIP.AUTO-MCNC: 173 MG/DL (ref 65–117)
GLUCOSE BLD STRIP.AUTO-MCNC: 177 MG/DL (ref 65–117)
GLUCOSE BLD STRIP.AUTO-MCNC: 191 MG/DL (ref 65–117)
GLUCOSE BLD STRIP.AUTO-MCNC: 196 MG/DL (ref 65–117)
GLUCOSE BLD STRIP.AUTO-MCNC: 204 MG/DL (ref 65–117)
GLUCOSE BLD STRIP.AUTO-MCNC: 208 MG/DL (ref 65–117)
GLUCOSE BLD STRIP.AUTO-MCNC: 210 MG/DL (ref 65–117)
GLUCOSE BLD STRIP.AUTO-MCNC: 219 MG/DL (ref 65–117)
GLUCOSE BLD STRIP.AUTO-MCNC: 322 MG/DL (ref 65–117)
GLUCOSE BLD STRIP.AUTO-MCNC: 359 MG/DL (ref 65–117)
GLUCOSE BLD STRIP.AUTO-MCNC: 360 MG/DL (ref 65–117)
GLUCOSE BLD STRIP.AUTO-MCNC: 401 MG/DL (ref 65–117)
GLUCOSE BLD STRIP.AUTO-MCNC: 411 MG/DL (ref 65–117)
GLUCOSE SERPL-MCNC: 200 MG/DL (ref 65–100)
GLUCOSE SERPL-MCNC: 236 MG/DL (ref 65–100)
GLUCOSE SERPL-MCNC: 472 MG/DL (ref 65–100)
GLUCOSE UR STRIP.AUTO-MCNC: >1000 MG/DL
HBA1C MFR BLD: 12.7 % (ref 4–5.6)
HCT VFR BLD AUTO: 55.9 % (ref 36.6–50.3)
HGB BLD-MCNC: 18.7 G/DL (ref 12.1–17)
HGB UR QL STRIP: ABNORMAL
HYALINE CASTS URNS QL MICRO: ABNORMAL /LPF (ref 0–5)
IMM GRANULOCYTES # BLD AUTO: 0.1 K/UL (ref 0–0.04)
IMM GRANULOCYTES NFR BLD AUTO: 1 % (ref 0–0.5)
KETONES UR QL STRIP.AUTO: >80 MG/DL
LACTATE SERPL-SCNC: 1.8 MMOL/L (ref 0.4–2)
LACTATE SERPL-SCNC: 2.6 MMOL/L (ref 0.4–2)
LEUKOCYTE ESTERASE UR QL STRIP.AUTO: NEGATIVE
LIPASE SERPL-CCNC: 9 U/L (ref 13–75)
LYMPHOCYTES # BLD: 0.9 K/UL (ref 0.8–3.5)
LYMPHOCYTES NFR BLD: 10 % (ref 12–49)
MAGNESIUM SERPL-MCNC: 2.3 MG/DL (ref 1.6–2.4)
MAGNESIUM SERPL-MCNC: 2.5 MG/DL (ref 1.6–2.4)
MAGNESIUM SERPL-MCNC: 2.7 MG/DL (ref 1.6–2.4)
MAGNESIUM SERPL-MCNC: NORMAL MG/DL (ref 1.6–2.4)
MCH RBC QN AUTO: 29 PG (ref 26–34)
MCHC RBC AUTO-ENTMCNC: 33.5 G/DL (ref 30–36.5)
MCV RBC AUTO: 86.7 FL (ref 80–99)
MONOCYTES # BLD: 0.8 K/UL (ref 0–1)
MONOCYTES NFR BLD: 9 % (ref 5–13)
NEUTS SEG # BLD: 7.2 K/UL (ref 1.8–8)
NEUTS SEG NFR BLD: 80 % (ref 32–75)
NITRITE UR QL STRIP.AUTO: NEGATIVE
NRBC # BLD: 0 K/UL (ref 0–0.01)
NRBC BLD-RTO: 0 PER 100 WBC
PH UR STRIP: 5.5 (ref 5–8)
PHOSPHATE SERPL-MCNC: 1 MG/DL (ref 2.6–4.7)
PHOSPHATE SERPL-MCNC: 1.2 MG/DL (ref 2.6–4.7)
PHOSPHATE SERPL-MCNC: 1.7 MG/DL (ref 2.6–4.7)
PLATELET # BLD AUTO: 190 K/UL (ref 150–400)
PMV BLD AUTO: 9.7 FL (ref 8.9–12.9)
POTASSIUM SERPL-SCNC: 3.2 MMOL/L (ref 3.5–5.1)
POTASSIUM SERPL-SCNC: 4 MMOL/L (ref 3.5–5.1)
POTASSIUM SERPL-SCNC: 4.4 MMOL/L (ref 3.5–5.1)
POTASSIUM SERPL-SCNC: 4.5 MMOL/L (ref 3.5–5.1)
POTASSIUM SERPL-SCNC: ABNORMAL MMOL/L (ref 3.5–5.1)
PROT SERPL-MCNC: 9.1 G/DL (ref 6.4–8.2)
PROT UR STRIP-MCNC: 100 MG/DL
RBC # BLD AUTO: 6.45 M/UL (ref 4.1–5.7)
RBC #/AREA URNS HPF: ABNORMAL /HPF (ref 0–5)
SARS-COV-2 RDRP RESP QL NAA+PROBE: NOT DETECTED
SERVICE CMNT-IMP: ABNORMAL
SODIUM SERPL-SCNC: 141 MMOL/L (ref 136–145)
SODIUM SERPL-SCNC: 142 MMOL/L (ref 136–145)
SODIUM SERPL-SCNC: 147 MMOL/L (ref 136–145)
SOURCE: NORMAL
SP GR UR REFRACTOMETRY: >1.03
SPECIMEN HOLD: NORMAL
SPECIMEN HOLD: NORMAL
TROPONIN I SERPL HS-MCNC: 25 NG/L (ref 0–76)
UROBILINOGEN UR QL STRIP.AUTO: 0.2 EU/DL (ref 0.2–1)
WBC # BLD AUTO: 9 K/UL (ref 4.1–11.1)
WBC URNS QL MICRO: ABNORMAL /HPF (ref 0–4)

## 2024-01-09 PROCEDURE — 2500000003 HC RX 250 WO HCPCS: Performed by: STUDENT IN AN ORGANIZED HEALTH CARE EDUCATION/TRAINING PROGRAM

## 2024-01-09 PROCEDURE — 2580000003 HC RX 258: Performed by: NURSE PRACTITIONER

## 2024-01-09 PROCEDURE — 6360000002 HC RX W HCPCS: Performed by: EMERGENCY MEDICINE

## 2024-01-09 PROCEDURE — 83036 HEMOGLOBIN GLYCOSYLATED A1C: CPT

## 2024-01-09 PROCEDURE — 84484 ASSAY OF TROPONIN QUANT: CPT

## 2024-01-09 PROCEDURE — 81001 URINALYSIS AUTO W/SCOPE: CPT

## 2024-01-09 PROCEDURE — 82962 GLUCOSE BLOOD TEST: CPT

## 2024-01-09 PROCEDURE — 83605 ASSAY OF LACTIC ACID: CPT

## 2024-01-09 PROCEDURE — 6370000000 HC RX 637 (ALT 250 FOR IP)

## 2024-01-09 PROCEDURE — 87804 INFLUENZA ASSAY W/OPTIC: CPT

## 2024-01-09 PROCEDURE — 6370000000 HC RX 637 (ALT 250 FOR IP): Performed by: STUDENT IN AN ORGANIZED HEALTH CARE EDUCATION/TRAINING PROGRAM

## 2024-01-09 PROCEDURE — 2580000003 HC RX 258

## 2024-01-09 PROCEDURE — 99285 EMERGENCY DEPT VISIT HI MDM: CPT

## 2024-01-09 PROCEDURE — 2580000003 HC RX 258: Performed by: EMERGENCY MEDICINE

## 2024-01-09 PROCEDURE — 99221 1ST HOSP IP/OBS SF/LOW 40: CPT | Performed by: CLINICAL NURSE SPECIALIST

## 2024-01-09 PROCEDURE — 93005 ELECTROCARDIOGRAM TRACING: CPT | Performed by: FAMILY MEDICINE

## 2024-01-09 PROCEDURE — 83690 ASSAY OF LIPASE: CPT

## 2024-01-09 PROCEDURE — 70450 CT HEAD/BRAIN W/O DYE: CPT

## 2024-01-09 PROCEDURE — 85025 COMPLETE CBC W/AUTO DIFF WBC: CPT

## 2024-01-09 PROCEDURE — 96374 THER/PROPH/DIAG INJ IV PUSH: CPT

## 2024-01-09 PROCEDURE — C9113 INJ PANTOPRAZOLE SODIUM, VIA: HCPCS | Performed by: EMERGENCY MEDICINE

## 2024-01-09 PROCEDURE — 80053 COMPREHEN METABOLIC PANEL: CPT

## 2024-01-09 PROCEDURE — 6360000002 HC RX W HCPCS

## 2024-01-09 PROCEDURE — 36415 COLL VENOUS BLD VENIPUNCTURE: CPT

## 2024-01-09 PROCEDURE — A4216 STERILE WATER/SALINE, 10 ML: HCPCS | Performed by: EMERGENCY MEDICINE

## 2024-01-09 PROCEDURE — 80048 BASIC METABOLIC PNL TOTAL CA: CPT

## 2024-01-09 PROCEDURE — 87635 SARS-COV-2 COVID-19 AMP PRB: CPT

## 2024-01-09 PROCEDURE — 84132 ASSAY OF SERUM POTASSIUM: CPT

## 2024-01-09 PROCEDURE — 93005 ELECTROCARDIOGRAM TRACING: CPT | Performed by: STUDENT IN AN ORGANIZED HEALTH CARE EDUCATION/TRAINING PROGRAM

## 2024-01-09 PROCEDURE — 93005 ELECTROCARDIOGRAM TRACING: CPT | Performed by: EMERGENCY MEDICINE

## 2024-01-09 PROCEDURE — 2500000003 HC RX 250 WO HCPCS: Performed by: NURSE PRACTITIONER

## 2024-01-09 PROCEDURE — 84100 ASSAY OF PHOSPHORUS: CPT

## 2024-01-09 PROCEDURE — 2060000000 HC ICU INTERMEDIATE R&B

## 2024-01-09 PROCEDURE — 83735 ASSAY OF MAGNESIUM: CPT

## 2024-01-09 PROCEDURE — 2580000003 HC RX 258: Performed by: STUDENT IN AN ORGANIZED HEALTH CARE EDUCATION/TRAINING PROGRAM

## 2024-01-09 PROCEDURE — 71045 X-RAY EXAM CHEST 1 VIEW: CPT

## 2024-01-09 PROCEDURE — 96375 TX/PRO/DX INJ NEW DRUG ADDON: CPT

## 2024-01-09 RX ORDER — DEXTROSE AND SODIUM CHLORIDE 5; .45 G/100ML; G/100ML
INJECTION, SOLUTION INTRAVENOUS CONTINUOUS PRN
Status: DISCONTINUED | OUTPATIENT
Start: 2024-01-09 | End: 2024-01-16 | Stop reason: HOSPADM

## 2024-01-09 RX ORDER — ONDANSETRON 2 MG/ML
4 INJECTION INTRAMUSCULAR; INTRAVENOUS
Status: COMPLETED | OUTPATIENT
Start: 2024-01-09 | End: 2024-01-09

## 2024-01-09 RX ORDER — SODIUM CHLORIDE 9 MG/ML
INJECTION, SOLUTION INTRAVENOUS CONTINUOUS
Status: DISCONTINUED | OUTPATIENT
Start: 2024-01-09 | End: 2024-01-09

## 2024-01-09 RX ORDER — HYDRALAZINE HYDROCHLORIDE 20 MG/ML
5 INJECTION INTRAMUSCULAR; INTRAVENOUS EVERY 6 HOURS PRN
Status: DISCONTINUED | OUTPATIENT
Start: 2024-01-09 | End: 2024-01-16 | Stop reason: HOSPADM

## 2024-01-09 RX ORDER — POLYETHYLENE GLYCOL 3350 17 G/17G
17 POWDER, FOR SOLUTION ORAL DAILY PRN
Status: DISCONTINUED | OUTPATIENT
Start: 2024-01-09 | End: 2024-01-16 | Stop reason: HOSPADM

## 2024-01-09 RX ORDER — ATORVASTATIN CALCIUM 40 MG/1
80 TABLET, FILM COATED ORAL DAILY
Status: DISCONTINUED | OUTPATIENT
Start: 2024-01-09 | End: 2024-01-15

## 2024-01-09 RX ORDER — 0.9 % SODIUM CHLORIDE 0.9 %
1000 INTRAVENOUS SOLUTION INTRAVENOUS ONCE
Status: COMPLETED | OUTPATIENT
Start: 2024-01-09 | End: 2024-01-09

## 2024-01-09 RX ORDER — POTASSIUM CHLORIDE 7.45 MG/ML
10 INJECTION INTRAVENOUS PRN
Status: DISCONTINUED | OUTPATIENT
Start: 2024-01-09 | End: 2024-01-16 | Stop reason: HOSPADM

## 2024-01-09 RX ORDER — ASPIRIN 81 MG/1
81 TABLET, CHEWABLE ORAL DAILY
Status: DISCONTINUED | OUTPATIENT
Start: 2024-01-09 | End: 2024-01-16 | Stop reason: HOSPADM

## 2024-01-09 RX ORDER — SODIUM CHLORIDE 450 MG/100ML
INJECTION, SOLUTION INTRAVENOUS CONTINUOUS
Status: DISCONTINUED | OUTPATIENT
Start: 2024-01-09 | End: 2024-01-11

## 2024-01-09 RX ORDER — AMLODIPINE BESYLATE 5 MG/1
5 TABLET ORAL DAILY
Status: DISCONTINUED | OUTPATIENT
Start: 2024-01-09 | End: 2024-01-16 | Stop reason: HOSPADM

## 2024-01-09 RX ORDER — POTASSIUM CHLORIDE 7.45 MG/ML
10 INJECTION INTRAVENOUS PRN
Status: DISCONTINUED | OUTPATIENT
Start: 2024-01-09 | End: 2024-01-09

## 2024-01-09 RX ORDER — SODIUM CHLORIDE 450 MG/100ML
INJECTION, SOLUTION INTRAVENOUS CONTINUOUS
Status: DISCONTINUED | OUTPATIENT
Start: 2024-01-09 | End: 2024-01-09 | Stop reason: SDUPTHER

## 2024-01-09 RX ORDER — MAGNESIUM SULFATE IN WATER 40 MG/ML
2000 INJECTION, SOLUTION INTRAVENOUS PRN
Status: DISCONTINUED | OUTPATIENT
Start: 2024-01-09 | End: 2024-01-16 | Stop reason: HOSPADM

## 2024-01-09 RX ORDER — ENOXAPARIN SODIUM 100 MG/ML
40 INJECTION SUBCUTANEOUS DAILY
Status: DISCONTINUED | OUTPATIENT
Start: 2024-01-09 | End: 2024-01-16 | Stop reason: HOSPADM

## 2024-01-09 RX ADMIN — DEXTROSE AND SODIUM CHLORIDE: 5; 450 INJECTION, SOLUTION INTRAVENOUS at 16:21

## 2024-01-09 RX ADMIN — SODIUM PHOSPHATE, MONOBASIC, MONOHYDRATE AND SODIUM PHOSPHATE, DIBASIC, ANHYDROUS 15 MMOL: 142; 276 INJECTION, SOLUTION INTRAVENOUS at 21:10

## 2024-01-09 RX ADMIN — SODIUM CHLORIDE 1000 ML: 9 INJECTION, SOLUTION INTRAVENOUS at 09:49

## 2024-01-09 RX ADMIN — SODIUM CHLORIDE: 9 INJECTION, SOLUTION INTRAVENOUS at 09:49

## 2024-01-09 RX ADMIN — ENOXAPARIN SODIUM 40 MG: 100 INJECTION SUBCUTANEOUS at 13:19

## 2024-01-09 RX ADMIN — POTASSIUM CHLORIDE 10 MEQ: 10 INJECTION, SOLUTION INTRAVENOUS at 13:25

## 2024-01-09 RX ADMIN — ONDANSETRON 4 MG: 2 INJECTION INTRAMUSCULAR; INTRAVENOUS at 09:49

## 2024-01-09 RX ADMIN — POTASSIUM CHLORIDE 10 MEQ: 10 INJECTION, SOLUTION INTRAVENOUS at 23:42

## 2024-01-09 RX ADMIN — SODIUM CHLORIDE 40 MG: 9 INJECTION INTRAMUSCULAR; INTRAVENOUS; SUBCUTANEOUS at 09:49

## 2024-01-09 RX ADMIN — SODIUM CHLORIDE: 4.5 INJECTION, SOLUTION INTRAVENOUS at 12:12

## 2024-01-09 RX ADMIN — POTASSIUM CHLORIDE 10 MEQ: 10 INJECTION, SOLUTION INTRAVENOUS at 22:33

## 2024-01-09 RX ADMIN — ASPIRIN 81 MG: 81 TABLET, CHEWABLE ORAL at 14:11

## 2024-01-09 RX ADMIN — POTASSIUM CHLORIDE 10 MEQ: 10 INJECTION, SOLUTION INTRAVENOUS at 14:50

## 2024-01-09 RX ADMIN — SODIUM PHOSPHATE, MONOBASIC, MONOHYDRATE AND SODIUM PHOSPHATE, DIBASIC, ANHYDROUS 20 MMOL: 142; 276 INJECTION, SOLUTION INTRAVENOUS at 21:58

## 2024-01-09 RX ADMIN — ATORVASTATIN CALCIUM 80 MG: 40 TABLET, FILM COATED ORAL at 14:11

## 2024-01-09 RX ADMIN — AMLODIPINE BESYLATE 5 MG: 5 TABLET ORAL at 14:11

## 2024-01-09 RX ADMIN — SODIUM PHOSPHATE, MONOBASIC, MONOHYDRATE AND SODIUM PHOSPHATE, DIBASIC, ANHYDROUS 15 MMOL: 142; 276 INJECTION, SOLUTION INTRAVENOUS at 14:17

## 2024-01-09 RX ADMIN — SODIUM CHLORIDE 6.82 UNITS/HR: 9 INJECTION, SOLUTION INTRAVENOUS at 12:15

## 2024-01-09 NOTE — ED NOTES
Per MD Dodge via perfect serve- will wait to collect scheduled BMP/Mg/Phos until K and sodium phosphate infusions are completed.

## 2024-01-09 NOTE — ED TRIAGE NOTES
Pt arrives from home with his wife with cc of uncontrolled hypertension, cough, and nausea with vomiting. The pt has been unable to hold any food down for over a day.     The pt is diabetic and has htn but has been non compliant with medications. No PCP at this time.     Denies chest pain, dizziness, or sob.

## 2024-01-09 NOTE — H&P
used for addtional testing after 24 hours, recollection will be required.          COVID-19, Rapid [[id number]] Collected: 01/09/24 1023    Order Status: Completed Specimen: Nasopharyngeal Updated: 01/09/24 1052     Source Nasopharyngeal        SARS-CoV-2, Rapid Not detected        Comment: Rapid Abbott ID Now     Rapid NAAT:  The specimen is NEGATIVE for SARS-CoV-2, the novel coronavirus associated with COVID-19.     Negative results should be treated as presumptive and, if inconsistent with clinical signs and symptoms or necessary for patient management, should be tested with an alternative molecular assay.  Negative results do not preclude SARS-CoV-2 infection and should not be used as the sole basis for patient management decisions.     This test has been authorized by the FDA under an Emergency Use Authorization (EUA) for use by authorized laboratories.   Fact sheet for Healthcare Providers:  https://www.fda.gov/media/965097/download  Fact sheet for Patients: https://www.fda.gov/media/319628/download     Methodology: Isothermal Nucleic Acid Amplification         Rapid influenza A/B antigens [[id number]]  (Abnormal) Collected: 01/09/24 1022    Order Status: Completed Specimen: Nasopharyngeal Updated: 01/09/24 1047     Influenza A Ag Positive        Influenza B Ag Negative                IMAGING:   CT Head W/O Contrast   Final Result   No acute process or change compared to the prior exam.            XR CHEST PORTABLE   Final Result      No acute process on portable chest.              ECG/ECHO:    Encounter Date: 01/09/24   EKG 12 Lead   Result Value    Ventricular Rate 110    Atrial Rate 110    P-R Interval 138    QRS Duration 146    Q-T Interval 400    QTc Calculation (Bazett) 541    P Axis 60    R Axis 4    T Axis 75    Diagnosis      Sinus tachycardia  Left bundle branch block  Abnormal ECG  When compared with ECG of 09-JAN-2024 09:19,  MANUAL COMPARISON REQUIRED, DATA IS UNCONFIRMED       No results found

## 2024-01-09 NOTE — ED PROVIDER NOTES
Flu positive.  Lactate is elevated at 2.6, he is receiving IV fluids.  He is in DKA, glucose is 472, bicarb is 10 and anion gap 21, insulin ordered    CRITICAL CARE NOTE :    IMPENDING DETERIORATION -CNS and Metabolic  ASSOCIATED RISK FACTORS - Metabolic changes, Dehydration, and CNS Decompensation  MANAGEMENT- Bedside Assessment  INTERPRETATION -  ECG  INTERVENTIONS - hemodynamic mngmt and Metobolic interventions  CASE REVIEW - Hospitalist/Intensivist  TREATMENT RESPONSE -Stable  PERFORMED BY - Self    Total critical care time (not including time spent performing separately reportable procedures): 40 minutes        PROCEDURES:  Unless otherwise noted below, none     Procedures      FINAL IMPRESSION      1. Nausea and vomiting, unspecified vomiting type    2. Upper respiratory tract infection, unspecified type    3. Altered mental status, unspecified altered mental status type    4. Diabetic ketoacidosis without coma associated with diabetes mellitus due to underlying condition (HCC)    5. Encephalopathy    6. Influenza A          DISPOSITION/PLAN   DISPOSITION Decision To Admit 01/09/2024 12:19:56 PM      (Please note that portions of this note were completed with a voice recognition program.  Efforts were made to edit the dictations but occasionally words are mis-transcribed.)    Robyn Jarrell DO (electronically signed)  Emergency Attending Physician / Physician Assistant / Nurse Practitioner        Robyn Jarrell DO  01/09/24 2168

## 2024-01-10 ENCOUNTER — APPOINTMENT (OUTPATIENT)
Facility: HOSPITAL | Age: 59
DRG: 720 | End: 2024-01-10
Payer: COMMERCIAL

## 2024-01-10 LAB
AMPHET UR QL SCN: NEGATIVE
ANION GAP SERPL CALC-SCNC: 2 MMOL/L (ref 5–15)
ANION GAP SERPL CALC-SCNC: 6 MMOL/L (ref 5–15)
ANION GAP SERPL CALC-SCNC: 7 MMOL/L (ref 5–15)
BARBITURATES UR QL SCN: NEGATIVE
BASOPHILS # BLD: 0 K/UL (ref 0–0.1)
BASOPHILS NFR BLD: 0 % (ref 0–1)
BENZODIAZ UR QL: NEGATIVE
BUN SERPL-MCNC: 12 MG/DL (ref 6–20)
BUN SERPL-MCNC: 6 MG/DL (ref 6–20)
BUN SERPL-MCNC: 8 MG/DL (ref 6–20)
BUN/CREAT SERPL: 10 (ref 12–20)
BUN/CREAT SERPL: 11 (ref 12–20)
BUN/CREAT SERPL: 14 (ref 12–20)
CALCIUM SERPL-MCNC: 7.8 MG/DL (ref 8.5–10.1)
CALCIUM SERPL-MCNC: 8.4 MG/DL (ref 8.5–10.1)
CALCIUM SERPL-MCNC: 8.7 MG/DL (ref 8.5–10.1)
CANNABINOIDS UR QL SCN: NEGATIVE
CHLORIDE SERPL-SCNC: 116 MMOL/L (ref 97–108)
CHLORIDE SERPL-SCNC: 122 MMOL/L (ref 97–108)
CHLORIDE SERPL-SCNC: 122 MMOL/L (ref 97–108)
CO2 SERPL-SCNC: 18 MMOL/L (ref 21–32)
CO2 SERPL-SCNC: 19 MMOL/L (ref 21–32)
CO2 SERPL-SCNC: 21 MMOL/L (ref 21–32)
COCAINE UR QL SCN: NEGATIVE
COMMENT:: NORMAL
CREAT SERPL-MCNC: 0.6 MG/DL (ref 0.7–1.3)
CREAT SERPL-MCNC: 0.76 MG/DL (ref 0.7–1.3)
CREAT SERPL-MCNC: 0.88 MG/DL (ref 0.7–1.3)
DIFFERENTIAL METHOD BLD: ABNORMAL
EOSINOPHIL # BLD: 0 K/UL (ref 0–0.4)
EOSINOPHIL NFR BLD: 0 % (ref 0–7)
ERYTHROCYTE [DISTWIDTH] IN BLOOD BY AUTOMATED COUNT: 13.6 % (ref 11.5–14.5)
GLUCOSE BLD STRIP.AUTO-MCNC: 116 MG/DL (ref 65–117)
GLUCOSE BLD STRIP.AUTO-MCNC: 126 MG/DL (ref 65–117)
GLUCOSE BLD STRIP.AUTO-MCNC: 132 MG/DL (ref 65–117)
GLUCOSE BLD STRIP.AUTO-MCNC: 159 MG/DL (ref 65–117)
GLUCOSE BLD STRIP.AUTO-MCNC: 160 MG/DL (ref 65–117)
GLUCOSE BLD STRIP.AUTO-MCNC: 176 MG/DL (ref 65–117)
GLUCOSE BLD STRIP.AUTO-MCNC: 176 MG/DL (ref 65–117)
GLUCOSE BLD STRIP.AUTO-MCNC: 181 MG/DL (ref 65–117)
GLUCOSE BLD STRIP.AUTO-MCNC: 199 MG/DL (ref 65–117)
GLUCOSE BLD STRIP.AUTO-MCNC: 243 MG/DL (ref 65–117)
GLUCOSE BLD STRIP.AUTO-MCNC: 248 MG/DL (ref 65–117)
GLUCOSE BLD STRIP.AUTO-MCNC: 381 MG/DL (ref 65–117)
GLUCOSE SERPL-MCNC: 112 MG/DL (ref 65–100)
GLUCOSE SERPL-MCNC: 180 MG/DL (ref 65–100)
GLUCOSE SERPL-MCNC: 227 MG/DL (ref 65–100)
HCT VFR BLD AUTO: 43.4 % (ref 36.6–50.3)
HGB BLD-MCNC: 15 G/DL (ref 12.1–17)
IMM GRANULOCYTES # BLD AUTO: 0 K/UL
IMM GRANULOCYTES NFR BLD AUTO: 0 %
LYMPHOCYTES # BLD: 0.4 K/UL (ref 0.8–3.5)
LYMPHOCYTES NFR BLD: 11 % (ref 12–49)
Lab: NORMAL
MAGNESIUM SERPL-MCNC: 1.9 MG/DL (ref 1.6–2.4)
MAGNESIUM SERPL-MCNC: 2 MG/DL (ref 1.6–2.4)
MCH RBC QN AUTO: 28.7 PG (ref 26–34)
MCHC RBC AUTO-ENTMCNC: 34.6 G/DL (ref 30–36.5)
MCV RBC AUTO: 83.1 FL (ref 80–99)
METHADONE UR QL: NEGATIVE
MONOCYTES # BLD: 0.3 K/UL (ref 0–1)
MONOCYTES NFR BLD: 9 % (ref 5–13)
NEUTS SEG # BLD: 2.7 K/UL (ref 1.8–8)
NEUTS SEG NFR BLD: 80 % (ref 32–75)
NRBC # BLD: 0 K/UL (ref 0–0.01)
NRBC BLD-RTO: 0 PER 100 WBC
OPIATES UR QL: NEGATIVE
PCP UR QL: NEGATIVE
PHOSPHATE SERPL-MCNC: 0.8 MG/DL (ref 2.6–4.7)
PHOSPHATE SERPL-MCNC: 1 MG/DL (ref 2.6–4.7)
PHOSPHATE SERPL-MCNC: 2 MG/DL (ref 2.6–4.7)
PLATELET # BLD AUTO: 142 K/UL (ref 150–400)
PMV BLD AUTO: 8.9 FL (ref 8.9–12.9)
POTASSIUM SERPL-SCNC: 3.2 MMOL/L (ref 3.5–5.1)
POTASSIUM SERPL-SCNC: 3.2 MMOL/L (ref 3.5–5.1)
POTASSIUM SERPL-SCNC: 3.3 MMOL/L (ref 3.5–5.1)
RBC # BLD AUTO: 5.22 M/UL (ref 4.1–5.7)
RBC MORPH BLD: ABNORMAL
SERVICE CMNT-IMP: ABNORMAL
SERVICE CMNT-IMP: NORMAL
SODIUM SERPL-SCNC: 142 MMOL/L (ref 136–145)
SODIUM SERPL-SCNC: 145 MMOL/L (ref 136–145)
SODIUM SERPL-SCNC: 146 MMOL/L (ref 136–145)
SPECIMEN HOLD: NORMAL
T4 FREE SERPL-MCNC: 1.2 NG/DL (ref 0.8–1.5)
TROPONIN I SERPL HS-MCNC: 58 NG/L (ref 0–76)
TROPONIN I SERPL HS-MCNC: 66 NG/L (ref 0–76)
TROPONIN I SERPL HS-MCNC: NORMAL NG/L (ref 0–76)
TSH SERPL DL<=0.05 MIU/L-ACNC: 0.32 UIU/ML (ref 0.36–3.74)
WBC # BLD AUTO: 3.4 K/UL (ref 4.1–11.1)
WBC MORPH BLD: ABNORMAL

## 2024-01-10 PROCEDURE — 93005 ELECTROCARDIOGRAM TRACING: CPT | Performed by: HOSPITALIST

## 2024-01-10 PROCEDURE — 6370000000 HC RX 637 (ALT 250 FOR IP): Performed by: NURSE PRACTITIONER

## 2024-01-10 PROCEDURE — 2500000003 HC RX 250 WO HCPCS: Performed by: HOSPITALIST

## 2024-01-10 PROCEDURE — 84443 ASSAY THYROID STIM HORMONE: CPT

## 2024-01-10 PROCEDURE — 2580000003 HC RX 258: Performed by: HOSPITALIST

## 2024-01-10 PROCEDURE — 71275 CT ANGIOGRAPHY CHEST: CPT

## 2024-01-10 PROCEDURE — 99223 1ST HOSP IP/OBS HIGH 75: CPT | Performed by: SPECIALIST

## 2024-01-10 PROCEDURE — 6360000002 HC RX W HCPCS

## 2024-01-10 PROCEDURE — 82962 GLUCOSE BLOOD TEST: CPT

## 2024-01-10 PROCEDURE — 74177 CT ABD & PELVIS W/CONTRAST: CPT

## 2024-01-10 PROCEDURE — 84439 ASSAY OF FREE THYROXINE: CPT

## 2024-01-10 PROCEDURE — 85025 COMPLETE CBC W/AUTO DIFF WBC: CPT

## 2024-01-10 PROCEDURE — 2060000000 HC ICU INTERMEDIATE R&B

## 2024-01-10 PROCEDURE — 6360000002 HC RX W HCPCS: Performed by: NURSE PRACTITIONER

## 2024-01-10 PROCEDURE — 6370000000 HC RX 637 (ALT 250 FOR IP)

## 2024-01-10 PROCEDURE — 2700000000 HC OXYGEN THERAPY PER DAY

## 2024-01-10 PROCEDURE — 6370000000 HC RX 637 (ALT 250 FOR IP): Performed by: STUDENT IN AN ORGANIZED HEALTH CARE EDUCATION/TRAINING PROGRAM

## 2024-01-10 PROCEDURE — 84484 ASSAY OF TROPONIN QUANT: CPT

## 2024-01-10 PROCEDURE — 6370000000 HC RX 637 (ALT 250 FOR IP): Performed by: HOSPITALIST

## 2024-01-10 PROCEDURE — 84100 ASSAY OF PHOSPHORUS: CPT

## 2024-01-10 PROCEDURE — 99232 SBSQ HOSP IP/OBS MODERATE 35: CPT | Performed by: CLINICAL NURSE SPECIALIST

## 2024-01-10 PROCEDURE — 80048 BASIC METABOLIC PNL TOTAL CA: CPT

## 2024-01-10 PROCEDURE — 36415 COLL VENOUS BLD VENIPUNCTURE: CPT

## 2024-01-10 PROCEDURE — 6360000002 HC RX W HCPCS: Performed by: HOSPITALIST

## 2024-01-10 PROCEDURE — 80307 DRUG TEST PRSMV CHEM ANLYZR: CPT

## 2024-01-10 PROCEDURE — 83735 ASSAY OF MAGNESIUM: CPT

## 2024-01-10 PROCEDURE — 2580000003 HC RX 258: Performed by: STUDENT IN AN ORGANIZED HEALTH CARE EDUCATION/TRAINING PROGRAM

## 2024-01-10 PROCEDURE — 87040 BLOOD CULTURE FOR BACTERIA: CPT

## 2024-01-10 PROCEDURE — 94760 N-INVAS EAR/PLS OXIMETRY 1: CPT

## 2024-01-10 PROCEDURE — 6360000004 HC RX CONTRAST MEDICATION: Performed by: NURSE PRACTITIONER

## 2024-01-10 PROCEDURE — 2580000003 HC RX 258: Performed by: NURSE PRACTITIONER

## 2024-01-10 RX ORDER — INSULIN LISPRO 100 [IU]/ML
0-4 INJECTION, SOLUTION INTRAVENOUS; SUBCUTANEOUS NIGHTLY
Status: DISCONTINUED | OUTPATIENT
Start: 2024-01-10 | End: 2024-01-12 | Stop reason: SDUPTHER

## 2024-01-10 RX ORDER — INSULIN LISPRO 100 [IU]/ML
0-8 INJECTION, SOLUTION INTRAVENOUS; SUBCUTANEOUS
Status: DISCONTINUED | OUTPATIENT
Start: 2024-01-10 | End: 2024-01-12

## 2024-01-10 RX ORDER — OSELTAMIVIR PHOSPHATE 75 MG/1
75 CAPSULE ORAL 2 TIMES DAILY
Status: COMPLETED | OUTPATIENT
Start: 2024-01-10 | End: 2024-01-15

## 2024-01-10 RX ORDER — ACETAMINOPHEN 650 MG/1
650 SUPPOSITORY RECTAL EVERY 4 HOURS PRN
Status: DISCONTINUED | OUTPATIENT
Start: 2024-01-10 | End: 2024-01-16 | Stop reason: HOSPADM

## 2024-01-10 RX ORDER — INSULIN GLARGINE 100 [IU]/ML
14 INJECTION, SOLUTION SUBCUTANEOUS DAILY
Status: DISCONTINUED | OUTPATIENT
Start: 2024-01-10 | End: 2024-01-11

## 2024-01-10 RX ORDER — ACETAMINOPHEN 325 MG/1
650 TABLET ORAL EVERY 4 HOURS PRN
Status: DISCONTINUED | OUTPATIENT
Start: 2024-01-10 | End: 2024-01-16 | Stop reason: HOSPADM

## 2024-01-10 RX ORDER — DEXTROSE MONOHYDRATE 100 MG/ML
INJECTION, SOLUTION INTRAVENOUS CONTINUOUS PRN
Status: DISCONTINUED | OUTPATIENT
Start: 2024-01-10 | End: 2024-01-16 | Stop reason: HOSPADM

## 2024-01-10 RX ADMIN — ASPIRIN 81 MG: 81 TABLET, CHEWABLE ORAL at 08:22

## 2024-01-10 RX ADMIN — AMIODARONE HYDROCHLORIDE 150 MG: 50 INJECTION, SOLUTION INTRAVENOUS at 03:03

## 2024-01-10 RX ADMIN — ATORVASTATIN CALCIUM 80 MG: 40 TABLET, FILM COATED ORAL at 08:22

## 2024-01-10 RX ADMIN — SODIUM CHLORIDE 2.29 UNITS/HR: 9 INJECTION, SOLUTION INTRAVENOUS at 04:36

## 2024-01-10 RX ADMIN — ENOXAPARIN SODIUM 40 MG: 100 INJECTION SUBCUTANEOUS at 08:22

## 2024-01-10 RX ADMIN — AMLODIPINE BESYLATE 5 MG: 5 TABLET ORAL at 08:22

## 2024-01-10 RX ADMIN — ACETAMINOPHEN 650 MG: 325 TABLET ORAL at 12:22

## 2024-01-10 RX ADMIN — POTASSIUM CHLORIDE 10 MEQ: 10 INJECTION, SOLUTION INTRAVENOUS at 01:03

## 2024-01-10 RX ADMIN — INSULIN LISPRO 6 UNITS: 100 INJECTION, SOLUTION INTRAVENOUS; SUBCUTANEOUS at 16:55

## 2024-01-10 RX ADMIN — AMIODARONE HYDROCHLORIDE 1 MG/MIN: 50 INJECTION, SOLUTION INTRAVENOUS at 04:30

## 2024-01-10 RX ADMIN — ACETAMINOPHEN 650 MG: 650 SUPPOSITORY RECTAL at 07:14

## 2024-01-10 RX ADMIN — POTASSIUM PHOSPHATE, MONOBASIC POTASSIUM PHOSPHATE, DIBASIC 30 MMOL: 224; 236 INJECTION, SOLUTION, CONCENTRATE INTRAVENOUS at 16:14

## 2024-01-10 RX ADMIN — INSULIN GLARGINE 14 UNITS: 100 INJECTION, SOLUTION SUBCUTANEOUS at 08:22

## 2024-01-10 RX ADMIN — IOPAMIDOL 100 ML: 755 INJECTION, SOLUTION INTRAVENOUS at 13:44

## 2024-01-10 RX ADMIN — DEXTROSE AND SODIUM CHLORIDE: 5; 450 INJECTION, SOLUTION INTRAVENOUS at 00:36

## 2024-01-10 RX ADMIN — POTASSIUM CHLORIDE 10 MEQ: 10 INJECTION, SOLUTION INTRAVENOUS at 02:08

## 2024-01-10 RX ADMIN — OSELTAMIVIR PHOSPHATE 75 MG: 75 CAPSULE ORAL at 21:20

## 2024-01-10 RX ADMIN — WATER 2000 MG: 1 INJECTION INTRAMUSCULAR; INTRAVENOUS; SUBCUTANEOUS at 16:14

## 2024-01-10 ASSESSMENT — PAIN SCALES - GENERAL
PAINLEVEL_OUTOF10: 0

## 2024-01-10 ASSESSMENT — PAIN - FUNCTIONAL ASSESSMENT: PAIN_FUNCTIONAL_ASSESSMENT: 0-10

## 2024-01-10 NOTE — ED NOTES
TRANSFER - OUT REPORT:    Verbal report given to LYRIC Guillen on Abner Willams  being transferred to  [facility] for routine progression of patient care       Report consisted of patient's Situation, Background, Assessment and   Recommendations(SBAR).     Information from the following report(s) Nurse Handoff Report, Index, ED Encounter Summary, ED SBAR, Adult Overview, MAR, and Recent Results was reviewed with the receiving nurse.    Robert Lee Fall Assessment:    Presents to emergency department  because of falls (Syncope, seizure, or loss of consciousness): No  Age > 70: No  Altered Mental Status, Intoxication with alcohol or substance confusion (Disorientation, impaired judgment, poor safety awaremess, or inability to follow instructions): Yes  Impaired Mobility: Ambulates or transfers with assistive devices or assistance; Unable to ambulate or transer.: Yes (wife stated pt was unable to walk this morning)  Nursing Judgement: Yes          Lines:   Peripheral IV 01/09/24 Right Antecubital (Active)   Site Assessment Clean, dry & intact 01/09/24 1206       Peripheral IV 01/10/24 Left Antecubital (Active)       Peripheral IV 01/10/24 Distal;Posterior;Right Forearm (Active)        Opportunity for questions and clarification was provided.      Patient transported with:  Monitor, O2 @ 4lpm, and Registered Nurse

## 2024-01-10 NOTE — ED NOTES
Bedside and Verbal shift change report given to LYRIC Jimenez (oncoming nurse) by LYRIC Andrews (offgoing nurse). Report included the following information Nurse Handoff Report, Index, ED Encounter Summary, ED SBAR, Adult Overview, MAR, and Recent Results.

## 2024-01-10 NOTE — ED NOTES
Bedside and Verbal shift change report given to LYRIC Jimenez (oncoming nurse) by LYRIC Andrews (offgoing nurse). Report included the following information Nurse Handoff Report, Index, ED Encounter Summary, ED SBAR, Adult Overview, MAR, Recent Results, Med Rec Status, Quality Measures, and Neuro Assessment.

## 2024-01-10 NOTE — ED NOTES
0112 Pt moaning and attempting to get out of bed. Pt unable to communicate what is going on. This nurse messaged provider BARB Dong.     0228 Pt pulled out both ivs on left arm. BARB Dong notified. Provider to place order for soft restraints.    0240 Rapid response called. Vtach reading on the monitor. Dominick Rodriguez Hayley, Anthony and this nurse at bedside. EKG obtained.    0243 LYRIC Grider and BARB Dong at bedside. Rapid response team at bedside.

## 2024-01-10 NOTE — SIGNIFICANT EVENT
Rapid Response Team    Rapid Response room ED 20 called at this time. Rapid Response Team Nurse responding.    A rapid response was called on this patient for Tachycardia      Response    Rapid Response Team at the bedside for evaluation.    BARB Perales responding. MD Darryl Grider, RN is the primary nurse during this episode.    Ongoing vital signs monitored throughout critical time.      Situation    Upon RRT arrival the Primary RN advised that the patient is here for DKA and AMS, and they observed the bedside telemetry monitor to be alarming \"VTACH\" so a rapid response was called, and EKG obtained.      On exam the patient is awake and alert, oriented only to self.  Bedside telemetry monitor demonstrates a wide complex tachycardia with p-waves and t-waves present with a rate in the 120s, BP 130s/70s.  EKG completed and provided to MD.  EKG demonstrates similar wide-complex tachycardia with p-waves and t-waves present.  Order received from MD for 150mg Amiodarone bolus followed by Amiodarone gtt.  VS assessed (See flowsheets).      Bedside Telemetry reviewed and it demonstrates the patient's baseline rhythm, ranging from 110s-120s.  Vtach alarm reviewed and it demonstrated the baseline rhythm with a rate in the 120s, p-waves and t-waves still present.  Alarm Telemetry Strip does not appear to be consistent with ventricular tachycardia, it appears more consistent with sinus tachycardia.  Order for amiodarone clarified given bedside telemetry findings, and MD advises to proceed with amiodarone administration for wide complex tachycardia.      Previous EKGs reviewed with MD and MD states to proceed with amiodarone administration (see MAR).      The patient was left in the care of their primary nursing team.    Checked in with primary RN prior to leaving. Opportunity for questions and concerns provided.    DI at the time of Rapid Response: 68      Sepsis Screening  Are two or more SIRS criteria present? No

## 2024-01-10 NOTE — ED NOTES
2329 Pts O2 sat dropped to 89 on 2 L nc, this nurse placed pt on 4 L nc.   2332 Pts O2 sat 96 on 4 L nc.   Angella Dong notified.

## 2024-01-11 ENCOUNTER — APPOINTMENT (OUTPATIENT)
Facility: HOSPITAL | Age: 59
DRG: 720 | End: 2024-01-11
Attending: INTERNAL MEDICINE
Payer: COMMERCIAL

## 2024-01-11 PROBLEM — I44.7 LBBB (LEFT BUNDLE BRANCH BLOCK): Status: ACTIVE | Noted: 2024-01-11

## 2024-01-11 LAB
ALBUMIN SERPL-MCNC: 2.1 G/DL (ref 3.5–5)
ALBUMIN/GLOB SERPL: 0.6 (ref 1.1–2.2)
ALP SERPL-CCNC: 62 U/L (ref 45–117)
ALT SERPL-CCNC: 28 U/L (ref 12–78)
ANION GAP SERPL CALC-SCNC: 13 MMOL/L (ref 5–15)
AST SERPL-CCNC: 32 U/L (ref 15–37)
BASOPHILS # BLD: 0 K/UL (ref 0–0.1)
BASOPHILS NFR BLD: 0 % (ref 0–1)
BILIRUB SERPL-MCNC: 0.9 MG/DL (ref 0.2–1)
BUN SERPL-MCNC: 8 MG/DL (ref 6–20)
BUN/CREAT SERPL: 19 (ref 12–20)
CALCIUM SERPL-MCNC: 8.2 MG/DL (ref 8.5–10.1)
CHLORIDE SERPL-SCNC: 119 MMOL/L (ref 97–108)
CO2 SERPL-SCNC: 16 MMOL/L (ref 21–32)
CREAT SERPL-MCNC: 0.42 MG/DL (ref 0.7–1.3)
DIFFERENTIAL METHOD BLD: ABNORMAL
ECHO AO ROOT DIAM: 3.1 CM
ECHO AO ROOT INDEX: 1.79 CM/M2
ECHO AV AREA PEAK VELOCITY: 3.9 CM2
ECHO AV AREA/BSA PEAK VELOCITY: 2.3 CM2/M2
ECHO AV PEAK GRADIENT: 7 MMHG
ECHO AV PEAK VELOCITY: 1.3 M/S
ECHO AV VELOCITY RATIO: 0.92
ECHO BSA: 1.73 M2
ECHO EST RA PRESSURE: 3 MMHG
ECHO LA DIAMETER INDEX: 2.49 CM/M2
ECHO LA DIAMETER: 4.3 CM
ECHO LA TO AORTIC ROOT RATIO: 1.39
ECHO LA VOL A-L A2C: 101 ML (ref 18–58)
ECHO LA VOL A-L A4C: 57 ML (ref 18–58)
ECHO LA VOL MOD A2C: 97 ML (ref 18–58)
ECHO LA VOL MOD A4C: 54 ML (ref 18–58)
ECHO LA VOLUME AREA LENGTH: 77 ML
ECHO LA VOLUME INDEX A-L A2C: 58 ML/M2 (ref 16–34)
ECHO LA VOLUME INDEX A-L A4C: 33 ML/M2 (ref 16–34)
ECHO LA VOLUME INDEX AREA LENGTH: 45 ML/M2 (ref 16–34)
ECHO LA VOLUME INDEX MOD A2C: 56 ML/M2 (ref 16–34)
ECHO LA VOLUME INDEX MOD A4C: 31 ML/M2 (ref 16–34)
ECHO LV EDV A2C: 172 ML
ECHO LV EDV A4C: 146 ML
ECHO LV EDV BP: 166 ML (ref 67–155)
ECHO LV EDV INDEX A4C: 84 ML/M2
ECHO LV EDV INDEX BP: 96 ML/M2
ECHO LV EDV NDEX A2C: 99 ML/M2
ECHO LV EJECTION FRACTION A2C: 45 %
ECHO LV EJECTION FRACTION A4C: 46 %
ECHO LV EJECTION FRACTION BIPLANE: 47 % (ref 55–100)
ECHO LV ESV A2C: 95 ML
ECHO LV ESV A4C: 79 ML
ECHO LV ESV BP: 88 ML (ref 22–58)
ECHO LV ESV INDEX A2C: 55 ML/M2
ECHO LV ESV INDEX A4C: 46 ML/M2
ECHO LV ESV INDEX BP: 51 ML/M2
ECHO LV FRACTIONAL SHORTENING: 16 % (ref 28–44)
ECHO LV INTERNAL DIMENSION DIASTOLE INDEX: 3.18 CM/M2
ECHO LV INTERNAL DIMENSION DIASTOLIC: 5.5 CM (ref 4.2–5.9)
ECHO LV INTERNAL DIMENSION SYSTOLIC INDEX: 2.66 CM/M2
ECHO LV INTERNAL DIMENSION SYSTOLIC: 4.6 CM
ECHO LV IVSD: 1.3 CM (ref 0.6–1)
ECHO LV MASS 2D: 320.9 G (ref 88–224)
ECHO LV MASS INDEX 2D: 185.5 G/M2 (ref 49–115)
ECHO LV POSTERIOR WALL DIASTOLIC: 1.4 CM (ref 0.6–1)
ECHO LV RELATIVE WALL THICKNESS RATIO: 0.51
ECHO LVOT AREA: 4.5 CM2
ECHO LVOT DIAM: 2.4 CM
ECHO LVOT PEAK GRADIENT: 5 MMHG
ECHO LVOT PEAK VELOCITY: 1.2 M/S
ECHO MV REGURGITANT PEAK GRADIENT: 25 MMHG
ECHO MV REGURGITANT PEAK VELOCITY: 2.5 M/S
ECHO PERICARDIUM POSTERIOR DIMENSION: 0.4 CM
ECHO PV MAX VELOCITY: 1.1 M/S
ECHO PV PEAK GRADIENT: 5 MMHG
ECHO RV FREE WALL PEAK S': 17 CM/S
ECHO RV TAPSE: 2.5 CM (ref 1.7–?)
EKG ATRIAL RATE: 110 BPM
EKG ATRIAL RATE: 112 BPM
EKG ATRIAL RATE: 124 BPM
EKG DIAGNOSIS: NORMAL
EKG P AXIS: 60 DEGREES
EKG P AXIS: 60 DEGREES
EKG P-R INTERVAL: 122 MS
EKG P-R INTERVAL: 138 MS
EKG P-R INTERVAL: 150 MS
EKG Q-T INTERVAL: 338 MS
EKG Q-T INTERVAL: 354 MS
EKG Q-T INTERVAL: 400 MS
EKG QRS DURATION: 140 MS
EKG QRS DURATION: 144 MS
EKG QRS DURATION: 146 MS
EKG QTC CALCULATION (BAZETT): 483 MS
EKG QTC CALCULATION (BAZETT): 485 MS
EKG QTC CALCULATION (BAZETT): 541 MS
EKG R AXIS: 0 DEGREES
EKG R AXIS: 4 DEGREES
EKG R AXIS: 55 DEGREES
EKG T AXIS: -4 DEGREES
EKG T AXIS: 157 DEGREES
EKG T AXIS: 75 DEGREES
EKG VENTRICULAR RATE: 110 BPM
EKG VENTRICULAR RATE: 112 BPM
EKG VENTRICULAR RATE: 124 BPM
EOSINOPHIL # BLD: 0 K/UL (ref 0–0.4)
EOSINOPHIL NFR BLD: 0 % (ref 0–7)
ERYTHROCYTE [DISTWIDTH] IN BLOOD BY AUTOMATED COUNT: 13.5 % (ref 11.5–14.5)
GLOBULIN SER CALC-MCNC: 3.3 G/DL (ref 2–4)
GLUCOSE BLD STRIP.AUTO-MCNC: 222 MG/DL (ref 65–117)
GLUCOSE BLD STRIP.AUTO-MCNC: 256 MG/DL (ref 65–117)
GLUCOSE BLD STRIP.AUTO-MCNC: 305 MG/DL (ref 65–117)
GLUCOSE BLD STRIP.AUTO-MCNC: 314 MG/DL (ref 65–117)
GLUCOSE SERPL-MCNC: 310 MG/DL (ref 65–100)
HCT VFR BLD AUTO: 40.2 % (ref 36.6–50.3)
HGB BLD-MCNC: 14.3 G/DL (ref 12.1–17)
IMM GRANULOCYTES # BLD AUTO: 0 K/UL
IMM GRANULOCYTES NFR BLD AUTO: 0 %
LYMPHOCYTES # BLD: 0.2 K/UL (ref 0.8–3.5)
LYMPHOCYTES NFR BLD: 4 % (ref 12–49)
MAGNESIUM SERPL-MCNC: 2.3 MG/DL (ref 1.6–2.4)
MCH RBC QN AUTO: 29.2 PG (ref 26–34)
MCHC RBC AUTO-ENTMCNC: 35.6 G/DL (ref 30–36.5)
MCV RBC AUTO: 82.2 FL (ref 80–99)
METAMYELOCYTES NFR BLD MANUAL: 4 %
MONOCYTES # BLD: 0.2 K/UL (ref 0–1)
MONOCYTES NFR BLD: 3 % (ref 5–13)
MYELOCYTES NFR BLD MANUAL: 5 %
NEUTS BAND NFR BLD MANUAL: 31 % (ref 0–6)
NEUTS SEG # BLD: 4.6 K/UL (ref 1.8–8)
NEUTS SEG NFR BLD: 53 % (ref 32–75)
NRBC # BLD: 0 K/UL (ref 0–0.01)
NRBC BLD-RTO: 0 PER 100 WBC
PHOSPHATE SERPL-MCNC: 1.2 MG/DL (ref 2.6–4.7)
PLATELET # BLD AUTO: 144 K/UL (ref 150–400)
PMV BLD AUTO: 9.4 FL (ref 8.9–12.9)
POTASSIUM SERPL-SCNC: 3.4 MMOL/L (ref 3.5–5.1)
PROT SERPL-MCNC: 5.4 G/DL (ref 6.4–8.2)
RBC # BLD AUTO: 4.89 M/UL (ref 4.1–5.7)
RBC MORPH BLD: ABNORMAL
RBC MORPH BLD: ABNORMAL
SERVICE CMNT-IMP: ABNORMAL
SODIUM SERPL-SCNC: 148 MMOL/L (ref 136–145)
WBC # BLD AUTO: 5.5 K/UL (ref 4.1–11.1)

## 2024-01-11 PROCEDURE — 2060000000 HC ICU INTERMEDIATE R&B

## 2024-01-11 PROCEDURE — 99232 SBSQ HOSP IP/OBS MODERATE 35: CPT | Performed by: SPECIALIST

## 2024-01-11 PROCEDURE — 6370000000 HC RX 637 (ALT 250 FOR IP): Performed by: CLINICAL NURSE SPECIALIST

## 2024-01-11 PROCEDURE — 83735 ASSAY OF MAGNESIUM: CPT

## 2024-01-11 PROCEDURE — 2580000003 HC RX 258: Performed by: HOSPITALIST

## 2024-01-11 PROCEDURE — 6370000000 HC RX 637 (ALT 250 FOR IP)

## 2024-01-11 PROCEDURE — 80053 COMPREHEN METABOLIC PANEL: CPT

## 2024-01-11 PROCEDURE — 93306 TTE W/DOPPLER COMPLETE: CPT | Performed by: SPECIALIST

## 2024-01-11 PROCEDURE — 93306 TTE W/DOPPLER COMPLETE: CPT

## 2024-01-11 PROCEDURE — 36415 COLL VENOUS BLD VENIPUNCTURE: CPT

## 2024-01-11 PROCEDURE — 85025 COMPLETE CBC W/AUTO DIFF WBC: CPT

## 2024-01-11 PROCEDURE — 84100 ASSAY OF PHOSPHORUS: CPT

## 2024-01-11 PROCEDURE — 2580000003 HC RX 258: Performed by: STUDENT IN AN ORGANIZED HEALTH CARE EDUCATION/TRAINING PROGRAM

## 2024-01-11 PROCEDURE — 2700000000 HC OXYGEN THERAPY PER DAY

## 2024-01-11 PROCEDURE — 6360000002 HC RX W HCPCS: Performed by: HOSPITALIST

## 2024-01-11 PROCEDURE — 93010 ELECTROCARDIOGRAM REPORT: CPT | Performed by: SPECIALIST

## 2024-01-11 PROCEDURE — 6370000000 HC RX 637 (ALT 250 FOR IP): Performed by: HOSPITALIST

## 2024-01-11 PROCEDURE — 2500000003 HC RX 250 WO HCPCS: Performed by: STUDENT IN AN ORGANIZED HEALTH CARE EDUCATION/TRAINING PROGRAM

## 2024-01-11 PROCEDURE — 82962 GLUCOSE BLOOD TEST: CPT

## 2024-01-11 PROCEDURE — 99231 SBSQ HOSP IP/OBS SF/LOW 25: CPT | Performed by: CLINICAL NURSE SPECIALIST

## 2024-01-11 PROCEDURE — 6360000002 HC RX W HCPCS

## 2024-01-11 PROCEDURE — 94760 N-INVAS EAR/PLS OXIMETRY 1: CPT

## 2024-01-11 RX ORDER — INSULIN GLARGINE 100 [IU]/ML
25 INJECTION, SOLUTION SUBCUTANEOUS DAILY
Status: DISCONTINUED | OUTPATIENT
Start: 2024-01-11 | End: 2024-01-12

## 2024-01-11 RX ORDER — GUAIFENESIN 600 MG/1
600 TABLET, EXTENDED RELEASE ORAL 2 TIMES DAILY
Status: DISCONTINUED | OUTPATIENT
Start: 2024-01-11 | End: 2024-01-16 | Stop reason: HOSPADM

## 2024-01-11 RX ADMIN — CEFEPIME 2000 MG: 2 INJECTION, POWDER, FOR SOLUTION INTRAVENOUS at 10:42

## 2024-01-11 RX ADMIN — ATORVASTATIN CALCIUM 80 MG: 40 TABLET, FILM COATED ORAL at 12:04

## 2024-01-11 RX ADMIN — POTASSIUM CHLORIDE 10 MEQ: 10 INJECTION, SOLUTION INTRAVENOUS at 07:20

## 2024-01-11 RX ADMIN — Medication 500 MG: at 17:13

## 2024-01-11 RX ADMIN — AMLODIPINE BESYLATE 5 MG: 5 TABLET ORAL at 10:36

## 2024-01-11 RX ADMIN — CEFEPIME 2000 MG: 2 INJECTION, POWDER, FOR SOLUTION INTRAVENOUS at 17:13

## 2024-01-11 RX ADMIN — SODIUM PHOSPHATE, MONOBASIC, MONOHYDRATE AND SODIUM PHOSPHATE, DIBASIC, ANHYDROUS 15 MMOL: 142; 276 INJECTION, SOLUTION INTRAVENOUS at 05:52

## 2024-01-11 RX ADMIN — OSELTAMIVIR PHOSPHATE 75 MG: 75 CAPSULE ORAL at 21:22

## 2024-01-11 RX ADMIN — POTASSIUM CHLORIDE 10 MEQ: 10 INJECTION, SOLUTION INTRAVENOUS at 07:22

## 2024-01-11 RX ADMIN — INSULIN LISPRO 6 UNITS: 100 INJECTION, SOLUTION INTRAVENOUS; SUBCUTANEOUS at 17:12

## 2024-01-11 RX ADMIN — GUAIFENESIN 600 MG: 600 TABLET, EXTENDED RELEASE ORAL at 21:22

## 2024-01-11 RX ADMIN — ASPIRIN 81 MG: 81 TABLET, CHEWABLE ORAL at 10:36

## 2024-01-11 RX ADMIN — INSULIN LISPRO 6 UNITS: 100 INJECTION, SOLUTION INTRAVENOUS; SUBCUTANEOUS at 11:49

## 2024-01-11 RX ADMIN — POTASSIUM CHLORIDE 10 MEQ: 10 INJECTION, SOLUTION INTRAVENOUS at 05:52

## 2024-01-11 RX ADMIN — HYDRALAZINE HYDROCHLORIDE 5 MG: 20 INJECTION INTRAMUSCULAR; INTRAVENOUS at 00:23

## 2024-01-11 RX ADMIN — CEFEPIME 2000 MG: 2 INJECTION, POWDER, FOR SOLUTION INTRAVENOUS at 00:13

## 2024-01-11 RX ADMIN — ENOXAPARIN SODIUM 40 MG: 100 INJECTION SUBCUTANEOUS at 10:36

## 2024-01-11 RX ADMIN — GUAIFENESIN 600 MG: 600 TABLET, EXTENDED RELEASE ORAL at 11:49

## 2024-01-11 RX ADMIN — OSELTAMIVIR PHOSPHATE 75 MG: 75 CAPSULE ORAL at 10:36

## 2024-01-11 RX ADMIN — INSULIN GLARGINE 25 UNITS: 100 INJECTION, SOLUTION SUBCUTANEOUS at 10:36

## 2024-01-11 ASSESSMENT — PAIN SCALES - GENERAL
PAINLEVEL_OUTOF10: 0

## 2024-01-12 ENCOUNTER — APPOINTMENT (OUTPATIENT)
Facility: HOSPITAL | Age: 59
DRG: 720 | End: 2024-01-12
Payer: COMMERCIAL

## 2024-01-12 LAB
ALBUMIN SERPL-MCNC: 1.9 G/DL (ref 3.5–5)
ALBUMIN/GLOB SERPL: 0.4 (ref 1.1–2.2)
ALP SERPL-CCNC: 74 U/L (ref 45–117)
ALT SERPL-CCNC: 31 U/L (ref 12–78)
ANION GAP SERPL CALC-SCNC: 5 MMOL/L (ref 5–15)
AST SERPL-CCNC: 30 U/L (ref 15–37)
BASOPHILS # BLD: 0 K/UL (ref 0–0.1)
BASOPHILS NFR BLD: 0 % (ref 0–1)
BILIRUB SERPL-MCNC: 0.7 MG/DL (ref 0.2–1)
BUN SERPL-MCNC: 10 MG/DL (ref 6–20)
BUN/CREAT SERPL: 17 (ref 12–20)
CALCIUM SERPL-MCNC: 7.8 MG/DL (ref 8.5–10.1)
CHLORIDE SERPL-SCNC: 106 MMOL/L (ref 97–108)
CO2 SERPL-SCNC: 26 MMOL/L (ref 21–32)
CREAT SERPL-MCNC: 0.6 MG/DL (ref 0.7–1.3)
DIFFERENTIAL METHOD BLD: NORMAL
EKG ATRIAL RATE: 113 BPM
EKG DIAGNOSIS: NORMAL
EKG P AXIS: 69 DEGREES
EKG P-R INTERVAL: 144 MS
EKG Q-T INTERVAL: 342 MS
EKG QRS DURATION: 142 MS
EKG QTC CALCULATION (BAZETT): 469 MS
EKG R AXIS: -49 DEGREES
EKG T AXIS: -7 DEGREES
EKG VENTRICULAR RATE: 113 BPM
EOSINOPHIL # BLD: 0 K/UL (ref 0–0.4)
EOSINOPHIL NFR BLD: 0 % (ref 0–7)
ERYTHROCYTE [DISTWIDTH] IN BLOOD BY AUTOMATED COUNT: 13.2 % (ref 11.5–14.5)
GLOBULIN SER CALC-MCNC: 4.3 G/DL (ref 2–4)
GLUCOSE BLD STRIP.AUTO-MCNC: 255 MG/DL (ref 65–117)
GLUCOSE BLD STRIP.AUTO-MCNC: 264 MG/DL (ref 65–117)
GLUCOSE BLD STRIP.AUTO-MCNC: 294 MG/DL (ref 65–117)
GLUCOSE BLD STRIP.AUTO-MCNC: 308 MG/DL (ref 65–117)
GLUCOSE BLD STRIP.AUTO-MCNC: 326 MG/DL (ref 65–117)
GLUCOSE SERPL-MCNC: 318 MG/DL (ref 65–100)
HCT VFR BLD AUTO: 37.9 % (ref 36.6–50.3)
HGB BLD-MCNC: 13.5 G/DL (ref 12.1–17)
IMM GRANULOCYTES # BLD AUTO: 0 K/UL
IMM GRANULOCYTES NFR BLD AUTO: 0 %
LYMPHOCYTES # BLD: 1 K/UL (ref 0.8–3.5)
LYMPHOCYTES NFR BLD: 16 % (ref 12–49)
MAGNESIUM SERPL-MCNC: 2.1 MG/DL (ref 1.6–2.4)
MCH RBC QN AUTO: 29 PG (ref 26–34)
MCHC RBC AUTO-ENTMCNC: 35.6 G/DL (ref 30–36.5)
MCV RBC AUTO: 81.3 FL (ref 80–99)
MONOCYTES # BLD: 0.6 K/UL (ref 0–1)
MONOCYTES NFR BLD: 10 % (ref 5–13)
NEUTS SEG # BLD: 4.5 K/UL (ref 1.8–8)
NEUTS SEG NFR BLD: 74 % (ref 32–75)
NRBC # BLD: 0 K/UL (ref 0–0.01)
NRBC BLD-RTO: 0 PER 100 WBC
PLATELET # BLD AUTO: 164 K/UL (ref 150–400)
PMV BLD AUTO: 9 FL (ref 8.9–12.9)
POTASSIUM SERPL-SCNC: 3.5 MMOL/L (ref 3.5–5.1)
PROT SERPL-MCNC: 6.2 G/DL (ref 6.4–8.2)
RBC # BLD AUTO: 4.66 M/UL (ref 4.1–5.7)
RBC MORPH BLD: NORMAL
SERVICE CMNT-IMP: ABNORMAL
SODIUM SERPL-SCNC: 137 MMOL/L (ref 136–145)
WBC # BLD AUTO: 6.1 K/UL (ref 4.1–11.1)

## 2024-01-12 PROCEDURE — 2060000000 HC ICU INTERMEDIATE R&B

## 2024-01-12 PROCEDURE — 80053 COMPREHEN METABOLIC PANEL: CPT

## 2024-01-12 PROCEDURE — 6370000000 HC RX 637 (ALT 250 FOR IP): Performed by: HOSPITALIST

## 2024-01-12 PROCEDURE — 71045 X-RAY EXAM CHEST 1 VIEW: CPT

## 2024-01-12 PROCEDURE — 6370000000 HC RX 637 (ALT 250 FOR IP): Performed by: CLINICAL NURSE SPECIALIST

## 2024-01-12 PROCEDURE — 85025 COMPLETE CBC W/AUTO DIFF WBC: CPT

## 2024-01-12 PROCEDURE — 36415 COLL VENOUS BLD VENIPUNCTURE: CPT

## 2024-01-12 PROCEDURE — 93010 ELECTROCARDIOGRAM REPORT: CPT | Performed by: SPECIALIST

## 2024-01-12 PROCEDURE — 6370000000 HC RX 637 (ALT 250 FOR IP)

## 2024-01-12 PROCEDURE — 6370000000 HC RX 637 (ALT 250 FOR IP): Performed by: NURSE PRACTITIONER

## 2024-01-12 PROCEDURE — 82962 GLUCOSE BLOOD TEST: CPT

## 2024-01-12 PROCEDURE — 70551 MRI BRAIN STEM W/O DYE: CPT

## 2024-01-12 PROCEDURE — 6360000002 HC RX W HCPCS: Performed by: HOSPITALIST

## 2024-01-12 PROCEDURE — 6360000002 HC RX W HCPCS

## 2024-01-12 PROCEDURE — 83735 ASSAY OF MAGNESIUM: CPT

## 2024-01-12 PROCEDURE — 2580000003 HC RX 258: Performed by: HOSPITALIST

## 2024-01-12 RX ORDER — INSULIN LISPRO 100 [IU]/ML
3 INJECTION, SOLUTION INTRAVENOUS; SUBCUTANEOUS
Status: DISCONTINUED | OUTPATIENT
Start: 2024-01-12 | End: 2024-01-13

## 2024-01-12 RX ORDER — INSULIN GLARGINE 100 [IU]/ML
10 INJECTION, SOLUTION SUBCUTANEOUS ONCE
Status: COMPLETED | OUTPATIENT
Start: 2024-01-12 | End: 2024-01-12

## 2024-01-12 RX ORDER — INSULIN GLARGINE 100 [IU]/ML
35 INJECTION, SOLUTION SUBCUTANEOUS DAILY
Status: DISCONTINUED | OUTPATIENT
Start: 2024-01-13 | End: 2024-01-13

## 2024-01-12 RX ORDER — INSULIN LISPRO 100 [IU]/ML
0-4 INJECTION, SOLUTION INTRAVENOUS; SUBCUTANEOUS NIGHTLY
Status: DISCONTINUED | OUTPATIENT
Start: 2024-01-12 | End: 2024-01-13 | Stop reason: SDUPTHER

## 2024-01-12 RX ORDER — INSULIN LISPRO 100 [IU]/ML
0-16 INJECTION, SOLUTION INTRAVENOUS; SUBCUTANEOUS
Status: DISCONTINUED | OUTPATIENT
Start: 2024-01-12 | End: 2024-01-13

## 2024-01-12 RX ADMIN — GUAIFENESIN 600 MG: 600 TABLET, EXTENDED RELEASE ORAL at 09:10

## 2024-01-12 RX ADMIN — INSULIN GLARGINE 25 UNITS: 100 INJECTION, SOLUTION SUBCUTANEOUS at 09:10

## 2024-01-12 RX ADMIN — ENOXAPARIN SODIUM 40 MG: 100 INJECTION SUBCUTANEOUS at 09:11

## 2024-01-12 RX ADMIN — INSULIN GLARGINE 10 UNITS: 100 INJECTION, SOLUTION SUBCUTANEOUS at 09:30

## 2024-01-12 RX ADMIN — INSULIN LISPRO 4 UNITS: 100 INJECTION, SOLUTION INTRAVENOUS; SUBCUTANEOUS at 09:11

## 2024-01-12 RX ADMIN — CEFEPIME 2000 MG: 2 INJECTION, POWDER, FOR SOLUTION INTRAVENOUS at 00:07

## 2024-01-12 RX ADMIN — ASPIRIN 81 MG: 81 TABLET, CHEWABLE ORAL at 09:10

## 2024-01-12 RX ADMIN — CEFEPIME 2000 MG: 2 INJECTION, POWDER, FOR SOLUTION INTRAVENOUS at 09:01

## 2024-01-12 RX ADMIN — Medication 500 MG: at 09:29

## 2024-01-12 RX ADMIN — INSULIN LISPRO 3 UNITS: 100 INJECTION, SOLUTION INTRAVENOUS; SUBCUTANEOUS at 17:30

## 2024-01-12 RX ADMIN — Medication 500 MG: at 17:30

## 2024-01-12 RX ADMIN — AMLODIPINE BESYLATE 5 MG: 5 TABLET ORAL at 09:10

## 2024-01-12 RX ADMIN — CEFEPIME 2000 MG: 2 INJECTION, POWDER, FOR SOLUTION INTRAVENOUS at 23:34

## 2024-01-12 RX ADMIN — INSULIN LISPRO 16 UNITS: 100 INJECTION, SOLUTION INTRAVENOUS; SUBCUTANEOUS at 17:31

## 2024-01-12 RX ADMIN — OSELTAMIVIR PHOSPHATE 75 MG: 75 CAPSULE ORAL at 21:34

## 2024-01-12 RX ADMIN — ATORVASTATIN CALCIUM 80 MG: 40 TABLET, FILM COATED ORAL at 09:10

## 2024-01-12 RX ADMIN — INSULIN LISPRO 8 UNITS: 100 INJECTION, SOLUTION INTRAVENOUS; SUBCUTANEOUS at 12:09

## 2024-01-12 RX ADMIN — CEFEPIME 2000 MG: 2 INJECTION, POWDER, FOR SOLUTION INTRAVENOUS at 16:30

## 2024-01-12 RX ADMIN — GUAIFENESIN 600 MG: 600 TABLET, EXTENDED RELEASE ORAL at 21:34

## 2024-01-12 RX ADMIN — OSELTAMIVIR PHOSPHATE 75 MG: 75 CAPSULE ORAL at 09:29

## 2024-01-12 RX ADMIN — ACETAMINOPHEN 650 MG: 325 TABLET ORAL at 21:34

## 2024-01-12 ASSESSMENT — PAIN SCALES - GENERAL
PAINLEVEL_OUTOF10: 0

## 2024-01-13 ENCOUNTER — APPOINTMENT (OUTPATIENT)
Facility: HOSPITAL | Age: 59
DRG: 720 | End: 2024-01-13
Attending: HOSPITALIST
Payer: COMMERCIAL

## 2024-01-13 ENCOUNTER — APPOINTMENT (OUTPATIENT)
Facility: HOSPITAL | Age: 59
DRG: 720 | End: 2024-01-13
Payer: COMMERCIAL

## 2024-01-13 LAB
ALBUMIN SERPL-MCNC: 1.8 G/DL (ref 3.5–5)
ALBUMIN/GLOB SERPL: 0.4 (ref 1.1–2.2)
ALP SERPL-CCNC: 69 U/L (ref 45–117)
ALT SERPL-CCNC: 26 U/L (ref 12–78)
ANION GAP SERPL CALC-SCNC: 5 MMOL/L (ref 5–15)
AST SERPL-CCNC: 21 U/L (ref 15–37)
BILIRUB SERPL-MCNC: 0.5 MG/DL (ref 0.2–1)
BUN SERPL-MCNC: 7 MG/DL (ref 6–20)
BUN/CREAT SERPL: 18 (ref 12–20)
CALCIUM SERPL-MCNC: 8 MG/DL (ref 8.5–10.1)
CHLORIDE SERPL-SCNC: 106 MMOL/L (ref 97–108)
CHOLEST SERPL-MCNC: 96 MG/DL
CO2 SERPL-SCNC: 30 MMOL/L (ref 21–32)
CREAT SERPL-MCNC: 0.38 MG/DL (ref 0.7–1.3)
ERYTHROCYTE [DISTWIDTH] IN BLOOD BY AUTOMATED COUNT: 12.9 % (ref 11.5–14.5)
GLOBULIN SER CALC-MCNC: 4.5 G/DL (ref 2–4)
GLUCOSE BLD STRIP.AUTO-MCNC: 214 MG/DL (ref 65–117)
GLUCOSE BLD STRIP.AUTO-MCNC: 215 MG/DL (ref 65–117)
GLUCOSE BLD STRIP.AUTO-MCNC: 239 MG/DL (ref 65–117)
GLUCOSE BLD STRIP.AUTO-MCNC: 265 MG/DL (ref 65–117)
GLUCOSE SERPL-MCNC: 244 MG/DL (ref 65–100)
HCT VFR BLD AUTO: 35.1 % (ref 36.6–50.3)
HDLC SERPL-MCNC: 34 MG/DL
HDLC SERPL: 2.8 (ref 0–5)
HGB BLD-MCNC: 12.6 G/DL (ref 12.1–17)
LDLC SERPL CALC-MCNC: 43.8 MG/DL (ref 0–100)
MCH RBC QN AUTO: 29 PG (ref 26–34)
MCHC RBC AUTO-ENTMCNC: 35.9 G/DL (ref 30–36.5)
MCV RBC AUTO: 80.7 FL (ref 80–99)
NRBC # BLD: 0 K/UL (ref 0–0.01)
NRBC BLD-RTO: 0 PER 100 WBC
PLATELET # BLD AUTO: 188 K/UL (ref 150–400)
PMV BLD AUTO: 9.1 FL (ref 8.9–12.9)
POTASSIUM SERPL-SCNC: 3.2 MMOL/L (ref 3.5–5.1)
PROT SERPL-MCNC: 6.3 G/DL (ref 6.4–8.2)
RBC # BLD AUTO: 4.35 M/UL (ref 4.1–5.7)
SERVICE CMNT-IMP: ABNORMAL
SODIUM SERPL-SCNC: 141 MMOL/L (ref 136–145)
TRIGL SERPL-MCNC: 91 MG/DL
VLDLC SERPL CALC-MCNC: 18.2 MG/DL
WBC # BLD AUTO: 5.6 K/UL (ref 4.1–11.1)

## 2024-01-13 PROCEDURE — 93308 TTE F-UP OR LMTD: CPT

## 2024-01-13 PROCEDURE — 2060000000 HC ICU INTERMEDIATE R&B

## 2024-01-13 PROCEDURE — 6370000000 HC RX 637 (ALT 250 FOR IP): Performed by: HOSPITALIST

## 2024-01-13 PROCEDURE — 93880 EXTRACRANIAL BILAT STUDY: CPT

## 2024-01-13 PROCEDURE — 6360000002 HC RX W HCPCS

## 2024-01-13 PROCEDURE — 80053 COMPREHEN METABOLIC PANEL: CPT

## 2024-01-13 PROCEDURE — 6360000002 HC RX W HCPCS: Performed by: HOSPITALIST

## 2024-01-13 PROCEDURE — 80061 LIPID PANEL: CPT

## 2024-01-13 PROCEDURE — 6370000000 HC RX 637 (ALT 250 FOR IP)

## 2024-01-13 PROCEDURE — 99223 1ST HOSP IP/OBS HIGH 75: CPT | Performed by: PSYCHIATRY & NEUROLOGY

## 2024-01-13 PROCEDURE — 6370000000 HC RX 637 (ALT 250 FOR IP): Performed by: CLINICAL NURSE SPECIALIST

## 2024-01-13 PROCEDURE — 2580000003 HC RX 258: Performed by: HOSPITALIST

## 2024-01-13 PROCEDURE — 85027 COMPLETE CBC AUTOMATED: CPT

## 2024-01-13 PROCEDURE — 97162 PT EVAL MOD COMPLEX 30 MIN: CPT

## 2024-01-13 PROCEDURE — 97112 NEUROMUSCULAR REEDUCATION: CPT

## 2024-01-13 PROCEDURE — 82962 GLUCOSE BLOOD TEST: CPT

## 2024-01-13 PROCEDURE — 36415 COLL VENOUS BLD VENIPUNCTURE: CPT

## 2024-01-13 RX ORDER — INSULIN LISPRO 100 [IU]/ML
0-4 INJECTION, SOLUTION INTRAVENOUS; SUBCUTANEOUS NIGHTLY
Status: DISCONTINUED | OUTPATIENT
Start: 2024-01-13 | End: 2024-01-16 | Stop reason: HOSPADM

## 2024-01-13 RX ORDER — INSULIN GLARGINE 100 [IU]/ML
42 INJECTION, SOLUTION SUBCUTANEOUS DAILY
Status: DISCONTINUED | OUTPATIENT
Start: 2024-01-13 | End: 2024-01-14

## 2024-01-13 RX ORDER — AMOXICILLIN AND CLAVULANATE POTASSIUM 875; 125 MG/1; MG/1
1 TABLET, FILM COATED ORAL EVERY 12 HOURS SCHEDULED
Status: DISCONTINUED | OUTPATIENT
Start: 2024-01-13 | End: 2024-01-16 | Stop reason: HOSPADM

## 2024-01-13 RX ORDER — INSULIN LISPRO 100 [IU]/ML
7 INJECTION, SOLUTION INTRAVENOUS; SUBCUTANEOUS
Status: DISCONTINUED | OUTPATIENT
Start: 2024-01-13 | End: 2024-01-16

## 2024-01-13 RX ORDER — LISINOPRIL 5 MG/1
5 TABLET ORAL DAILY
Status: DISCONTINUED | OUTPATIENT
Start: 2024-01-13 | End: 2024-01-16 | Stop reason: HOSPADM

## 2024-01-13 RX ORDER — CLOPIDOGREL BISULFATE 75 MG/1
75 TABLET ORAL DAILY
Status: DISCONTINUED | OUTPATIENT
Start: 2024-01-13 | End: 2024-01-16 | Stop reason: HOSPADM

## 2024-01-13 RX ORDER — INSULIN LISPRO 100 [IU]/ML
0-8 INJECTION, SOLUTION INTRAVENOUS; SUBCUTANEOUS
Status: DISCONTINUED | OUTPATIENT
Start: 2024-01-13 | End: 2024-01-16 | Stop reason: HOSPADM

## 2024-01-13 RX ADMIN — LISINOPRIL 5 MG: 5 TABLET ORAL at 09:02

## 2024-01-13 RX ADMIN — INSULIN LISPRO 4 UNITS: 100 INJECTION, SOLUTION INTRAVENOUS; SUBCUTANEOUS at 16:57

## 2024-01-13 RX ADMIN — ATORVASTATIN CALCIUM 80 MG: 40 TABLET, FILM COATED ORAL at 09:01

## 2024-01-13 RX ADMIN — Medication 500 MG: at 09:01

## 2024-01-13 RX ADMIN — POTASSIUM CHLORIDE 10 MEQ: 10 INJECTION, SOLUTION INTRAVENOUS at 11:42

## 2024-01-13 RX ADMIN — INSULIN LISPRO 4 UNITS: 100 INJECTION, SOLUTION INTRAVENOUS; SUBCUTANEOUS at 08:05

## 2024-01-13 RX ADMIN — CEFEPIME 2000 MG: 2 INJECTION, POWDER, FOR SOLUTION INTRAVENOUS at 08:05

## 2024-01-13 RX ADMIN — POTASSIUM CHLORIDE 10 MEQ: 10 INJECTION, SOLUTION INTRAVENOUS at 14:09

## 2024-01-13 RX ADMIN — ASPIRIN 81 MG: 81 TABLET, CHEWABLE ORAL at 09:02

## 2024-01-13 RX ADMIN — INSULIN LISPRO 7 UNITS: 100 INJECTION, SOLUTION INTRAVENOUS; SUBCUTANEOUS at 16:57

## 2024-01-13 RX ADMIN — OSELTAMIVIR PHOSPHATE 75 MG: 75 CAPSULE ORAL at 22:45

## 2024-01-13 RX ADMIN — INSULIN LISPRO 7 UNITS: 100 INJECTION, SOLUTION INTRAVENOUS; SUBCUTANEOUS at 11:46

## 2024-01-13 RX ADMIN — Medication 500 MG: at 16:58

## 2024-01-13 RX ADMIN — ENOXAPARIN SODIUM 40 MG: 100 INJECTION SUBCUTANEOUS at 09:01

## 2024-01-13 RX ADMIN — INSULIN LISPRO 2 UNITS: 100 INJECTION, SOLUTION INTRAVENOUS; SUBCUTANEOUS at 11:46

## 2024-01-13 RX ADMIN — AMLODIPINE BESYLATE 5 MG: 5 TABLET ORAL at 09:02

## 2024-01-13 RX ADMIN — POTASSIUM CHLORIDE 10 MEQ: 10 INJECTION, SOLUTION INTRAVENOUS at 15:59

## 2024-01-13 RX ADMIN — INSULIN LISPRO 3 UNITS: 100 INJECTION, SOLUTION INTRAVENOUS; SUBCUTANEOUS at 08:06

## 2024-01-13 RX ADMIN — OSELTAMIVIR PHOSPHATE 75 MG: 75 CAPSULE ORAL at 09:01

## 2024-01-13 RX ADMIN — GUAIFENESIN 600 MG: 600 TABLET, EXTENDED RELEASE ORAL at 22:45

## 2024-01-13 RX ADMIN — CEFEPIME 2000 MG: 2 INJECTION, POWDER, FOR SOLUTION INTRAVENOUS at 16:56

## 2024-01-13 RX ADMIN — CLOPIDOGREL BISULFATE 75 MG: 75 TABLET, FILM COATED ORAL at 09:02

## 2024-01-13 RX ADMIN — INSULIN GLARGINE 42 UNITS: 100 INJECTION, SOLUTION SUBCUTANEOUS at 09:01

## 2024-01-13 RX ADMIN — GUAIFENESIN 600 MG: 600 TABLET, EXTENDED RELEASE ORAL at 09:02

## 2024-01-13 RX ADMIN — AMOXICILLIN AND CLAVULANATE POTASSIUM 1 TABLET: 875; 125 TABLET, FILM COATED ORAL at 22:45

## 2024-01-13 ASSESSMENT — PAIN SCALES - GENERAL
PAINLEVEL_OUTOF10: 0

## 2024-01-13 NOTE — CONSULTS
Neurology Consult Note     NAME: Abner Willams   :  1965   MRN:  [id number]   DATE:  2024       HPI:  Pt is a 57yo RH male admitted 24 with uncontrolled HTN, cough, and N/V unable to keep POs down x 1 day per triage note.  Per H&P he presented with confusion that started that day and h/o influenza a few days prior to admission, treated with Tamiflu. Note mentions HTN and DM and medication non-compliance. /78, .  . LA 2.6, HgbA1C 12.7. Influenza A positive. O2 sats in ED 89% on 2L. Admitted for DKA.  RR called on 1/10/24 early AM for wide complex tachycardia. CT chest c/w viral PNA.  Due to ongoing AMS, MRI brain ordered 24, done 24 with acute infarcts in left thalamus and posterior limb of IC, mild CIWM changes, small chronic infarcts in left CR and occipital lobe. Incidental right parotid gland lesion, stable from , c/w neoplasm. CD neg. TTE pending.  Pt has been started on ASA 81mg, Plavix 75mg, and Lipitor 80mg daily.  He is also on cefepime for unclear reasons.  The pt is unable to provide a reliable history.  I called his wife to clarify if he was taking ASA 81mg daily or any other meds. She tells me that he was not taking any medications b/c he felt he could control his health issues with diet and exercise.  He had a stroke in  leaving him with right HH and was started on APT and statin at that time, but he stopped taking these medications.  CTA H/N 22 with mod stenosis of the basilar artery, left PCA irregular. MRI brain 22 with left occipital stroke.     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    
  01/09/2024 1.00   01/09/2024 1.40 (H)       Current meds:    Current Facility-Administered Medications:     iopamidol (ISOVUE-370) 76 % injection 100 mL, 100 mL, IntraVENous, ONCE PRN, Angella Dong APRN - NP    acetaminophen (TYLENOL) tablet 650 mg, 650 mg, Oral, Q4H PRN, Angella Dong APRN - NP    acetaminophen (TYLENOL) suppository 650 mg, 650 mg, Rectal, Q4H PRN, Angella Dong APRN - NP, 650 mg at 01/10/24 0714    insulin glargine (LANTUS) injection vial 14 Units, 14 Units, SubCUTAneous, Daily, Nely Goldberg MD, 14 Units at 01/10/24 0822    glucose chewable tablet 16 g, 4 tablet, Oral, PRN, Nely Goldberg MD    dextrose bolus 10% 125 mL, 125 mL, IntraVENous, PRN **OR** dextrose bolus 10% 250 mL, 250 mL, IntraVENous, PRN, Nely Goldberg MD    glucagon injection 1 mg, 1 mg, SubCUTAneous, PRN, Nely Goldberg MD    dextrose 10 % infusion, , IntraVENous, Continuous PRN, Nely Goldberg MD    insulin lispro (HUMALOG) injection vial 0-8 Units, 0-8 Units, SubCUTAneous, TID WC, Nely Goldberg MD    insulin lispro (HUMALOG) injection vial 0-4 Units, 0-4 Units, SubCUTAneous, Nightly, Nely Goldberg MD    insulin regular (HUMULIN R;NOVOLIN R) 100 Units in sodium chloride 0.9 % 100 mL infusion, 0.1-50 Units/hr, IntraVENous, Continuous, Nely Goldberg MD, Last Rate: 4.8 mL/hr at 01/10/24 0934, 4.75 Units/hr at 01/10/24 0934    dextrose bolus 10% 125 mL, 125 mL, IntraVENous, PRN **OR** dextrose bolus 10% 250 mL, 250 mL, IntraVENous, PRN, Robyn Jarrell DO    magnesium sulfate 2000 mg in 50 mL IVPB premix, 2,000 mg, IntraVENous, PRN, Robyn Jarrell,     sodium phosphate 15 mmol in sodium chloride 0.9 % 250 mL IVPB, 15 mmol, IntraVENous, PRN, Robyn Jarrell DO, Stopped at 01/09/24 2118    dextrose 5 % and 0.45 % sodium chloride infusion, , IntraVENous, Continuous PRN, Robyn Jarrell DO, Last Rate: 150 mL/hr at 01/10/24 0217, Rate Change at 01/10/24

## 2024-01-13 NOTE — TRANSITION OF CARE
1223   Report given to LYRIC Jimenez on 6[facility] to include current patient status, family with patient, current medications/IV fluids running, IV site and POC. Will transfer patient and family along with any belongings to [facility] asap

## 2024-01-14 ENCOUNTER — APPOINTMENT (OUTPATIENT)
Facility: HOSPITAL | Age: 59
DRG: 720 | End: 2024-01-14
Payer: COMMERCIAL

## 2024-01-14 LAB
ALBUMIN SERPL-MCNC: 1.8 G/DL (ref 3.5–5)
ALBUMIN/GLOB SERPL: 0.5 (ref 1.1–2.2)
ALP SERPL-CCNC: 70 U/L (ref 45–117)
ALT SERPL-CCNC: 26 U/L (ref 12–78)
ANION GAP SERPL CALC-SCNC: 5 MMOL/L (ref 5–15)
AST SERPL-CCNC: 20 U/L (ref 15–37)
BASOPHILS # BLD: 0.1 K/UL (ref 0–0.1)
BASOPHILS NFR BLD: 1 % (ref 0–1)
BILIRUB SERPL-MCNC: 0.5 MG/DL (ref 0.2–1)
BUN SERPL-MCNC: 7 MG/DL (ref 6–20)
BUN/CREAT SERPL: 21 (ref 12–20)
CALCIUM SERPL-MCNC: 7.6 MG/DL (ref 8.5–10.1)
CHLORIDE SERPL-SCNC: 100 MMOL/L (ref 97–108)
CO2 SERPL-SCNC: 30 MMOL/L (ref 21–32)
CREAT SERPL-MCNC: 0.34 MG/DL (ref 0.7–1.3)
DIFFERENTIAL METHOD BLD: ABNORMAL
ECHO BSA: 1.74 M2
EOSINOPHIL # BLD: 0.1 K/UL (ref 0–0.4)
EOSINOPHIL NFR BLD: 2 % (ref 0–7)
ERYTHROCYTE [DISTWIDTH] IN BLOOD BY AUTOMATED COUNT: 12.7 % (ref 11.5–14.5)
GLOBULIN SER CALC-MCNC: 3.6 G/DL (ref 2–4)
GLUCOSE BLD STRIP.AUTO-MCNC: 183 MG/DL (ref 65–117)
GLUCOSE BLD STRIP.AUTO-MCNC: 203 MG/DL (ref 65–117)
GLUCOSE BLD STRIP.AUTO-MCNC: 208 MG/DL (ref 65–117)
GLUCOSE BLD STRIP.AUTO-MCNC: 233 MG/DL (ref 65–117)
GLUCOSE SERPL-MCNC: 226 MG/DL (ref 65–100)
HCT VFR BLD AUTO: 34.7 % (ref 36.6–50.3)
HGB BLD-MCNC: 12.3 G/DL (ref 12.1–17)
IMM GRANULOCYTES # BLD AUTO: 0 K/UL
IMM GRANULOCYTES NFR BLD AUTO: 0 %
LYMPHOCYTES # BLD: 1.7 K/UL (ref 0.8–3.5)
LYMPHOCYTES NFR BLD: 25 % (ref 12–49)
MCH RBC QN AUTO: 28.6 PG (ref 26–34)
MCHC RBC AUTO-ENTMCNC: 35.4 G/DL (ref 30–36.5)
MCV RBC AUTO: 80.7 FL (ref 80–99)
MONOCYTES # BLD: 1.7 K/UL (ref 0–1)
MONOCYTES NFR BLD: 25 % (ref 5–13)
MYELOCYTES NFR BLD MANUAL: 1 %
NEUTS BAND NFR BLD MANUAL: 6 % (ref 0–6)
NEUTS SEG # BLD: 3.2 K/UL (ref 1.8–8)
NEUTS SEG NFR BLD: 40 % (ref 32–75)
NRBC # BLD: 0.02 K/UL (ref 0–0.01)
NRBC BLD-RTO: 0.3 PER 100 WBC
PLATELET # BLD AUTO: 207 K/UL (ref 150–400)
PLATELET COMMENT: ABNORMAL
PMV BLD AUTO: 9.1 FL (ref 8.9–12.9)
POTASSIUM SERPL-SCNC: 3.9 MMOL/L (ref 3.5–5.1)
PROT SERPL-MCNC: 5.4 G/DL (ref 6.4–8.2)
RBC # BLD AUTO: 4.3 M/UL (ref 4.1–5.7)
RBC MORPH BLD: ABNORMAL
SERVICE CMNT-IMP: ABNORMAL
SODIUM SERPL-SCNC: 135 MMOL/L (ref 136–145)
WBC # BLD AUTO: 6.9 K/UL (ref 4.1–11.1)

## 2024-01-14 PROCEDURE — 70498 CT ANGIOGRAPHY NECK: CPT

## 2024-01-14 PROCEDURE — 92523 SPEECH SOUND LANG COMPREHEN: CPT

## 2024-01-14 PROCEDURE — 6370000000 HC RX 637 (ALT 250 FOR IP): Performed by: HOSPITALIST

## 2024-01-14 PROCEDURE — 6360000004 HC RX CONTRAST MEDICATION: Performed by: RADIOLOGY

## 2024-01-14 PROCEDURE — 97166 OT EVAL MOD COMPLEX 45 MIN: CPT

## 2024-01-14 PROCEDURE — 82962 GLUCOSE BLOOD TEST: CPT

## 2024-01-14 PROCEDURE — 99232 SBSQ HOSP IP/OBS MODERATE 35: CPT | Performed by: PSYCHIATRY & NEUROLOGY

## 2024-01-14 PROCEDURE — 93308 TTE F-UP OR LMTD: CPT | Performed by: INTERNAL MEDICINE

## 2024-01-14 PROCEDURE — 6370000000 HC RX 637 (ALT 250 FOR IP)

## 2024-01-14 PROCEDURE — 97535 SELF CARE MNGMENT TRAINING: CPT

## 2024-01-14 PROCEDURE — 36415 COLL VENOUS BLD VENIPUNCTURE: CPT

## 2024-01-14 PROCEDURE — 85025 COMPLETE CBC W/AUTO DIFF WBC: CPT

## 2024-01-14 PROCEDURE — 92610 EVALUATE SWALLOWING FUNCTION: CPT

## 2024-01-14 PROCEDURE — 80053 COMPREHEN METABOLIC PANEL: CPT

## 2024-01-14 PROCEDURE — 2060000000 HC ICU INTERMEDIATE R&B

## 2024-01-14 PROCEDURE — 6360000002 HC RX W HCPCS

## 2024-01-14 RX ORDER — INSULIN GLARGINE 100 [IU]/ML
44 INJECTION, SOLUTION SUBCUTANEOUS DAILY
Status: DISCONTINUED | OUTPATIENT
Start: 2024-01-14 | End: 2024-01-16 | Stop reason: HOSPADM

## 2024-01-14 RX ADMIN — OSELTAMIVIR PHOSPHATE 75 MG: 75 CAPSULE ORAL at 08:46

## 2024-01-14 RX ADMIN — GUAIFENESIN 600 MG: 600 TABLET, EXTENDED RELEASE ORAL at 08:47

## 2024-01-14 RX ADMIN — ASPIRIN 81 MG: 81 TABLET, CHEWABLE ORAL at 08:47

## 2024-01-14 RX ADMIN — ATORVASTATIN CALCIUM 80 MG: 40 TABLET, FILM COATED ORAL at 08:46

## 2024-01-14 RX ADMIN — INSULIN LISPRO 7 UNITS: 100 INJECTION, SOLUTION INTRAVENOUS; SUBCUTANEOUS at 16:49

## 2024-01-14 RX ADMIN — AMLODIPINE BESYLATE 5 MG: 5 TABLET ORAL at 08:47

## 2024-01-14 RX ADMIN — Medication 500 MG: at 18:02

## 2024-01-14 RX ADMIN — AMOXICILLIN AND CLAVULANATE POTASSIUM 1 TABLET: 875; 125 TABLET, FILM COATED ORAL at 10:15

## 2024-01-14 RX ADMIN — LISINOPRIL 5 MG: 5 TABLET ORAL at 08:47

## 2024-01-14 RX ADMIN — INSULIN GLARGINE 44 UNITS: 100 INJECTION, SOLUTION SUBCUTANEOUS at 08:45

## 2024-01-14 RX ADMIN — INSULIN LISPRO 2 UNITS: 100 INJECTION, SOLUTION INTRAVENOUS; SUBCUTANEOUS at 16:49

## 2024-01-14 RX ADMIN — INSULIN LISPRO 2 UNITS: 100 INJECTION, SOLUTION INTRAVENOUS; SUBCUTANEOUS at 08:45

## 2024-01-14 RX ADMIN — OSELTAMIVIR PHOSPHATE 75 MG: 75 CAPSULE ORAL at 21:50

## 2024-01-14 RX ADMIN — IOPAMIDOL 100 ML: 755 INJECTION, SOLUTION INTRAVENOUS at 09:45

## 2024-01-14 RX ADMIN — AMOXICILLIN AND CLAVULANATE POTASSIUM 1 TABLET: 875; 125 TABLET, FILM COATED ORAL at 21:50

## 2024-01-14 RX ADMIN — ENOXAPARIN SODIUM 40 MG: 100 INJECTION SUBCUTANEOUS at 08:46

## 2024-01-14 RX ADMIN — CLOPIDOGREL BISULFATE 75 MG: 75 TABLET, FILM COATED ORAL at 08:47

## 2024-01-14 RX ADMIN — Medication 500 MG: at 08:46

## 2024-01-14 RX ADMIN — INSULIN LISPRO 7 UNITS: 100 INJECTION, SOLUTION INTRAVENOUS; SUBCUTANEOUS at 08:46

## 2024-01-14 RX ADMIN — INSULIN LISPRO 7 UNITS: 100 INJECTION, SOLUTION INTRAVENOUS; SUBCUTANEOUS at 11:33

## 2024-01-14 RX ADMIN — GUAIFENESIN 600 MG: 600 TABLET, EXTENDED RELEASE ORAL at 21:50

## 2024-01-15 ENCOUNTER — APPOINTMENT (OUTPATIENT)
Facility: HOSPITAL | Age: 59
DRG: 720 | End: 2024-01-15
Payer: COMMERCIAL

## 2024-01-15 PROBLEM — G93.40 ENCEPHALOPATHY ACUTE: Status: ACTIVE | Noted: 2024-01-15

## 2024-01-15 PROBLEM — R41.82 ALTERED MENTAL STATUS: Status: ACTIVE | Noted: 2024-01-15

## 2024-01-15 PROBLEM — I10 ESSENTIAL HYPERTENSION: Status: ACTIVE | Noted: 2024-01-15

## 2024-01-15 LAB
ALBUMIN SERPL-MCNC: 1.8 G/DL (ref 3.5–5)
ALBUMIN/GLOB SERPL: 0.4 (ref 1.1–2.2)
ALP SERPL-CCNC: 64 U/L (ref 45–117)
ALT SERPL-CCNC: 23 U/L (ref 12–78)
ANION GAP SERPL CALC-SCNC: 6 MMOL/L (ref 5–15)
AST SERPL-CCNC: 19 U/L (ref 15–37)
BACTERIA SPEC CULT: NORMAL
BACTERIA SPEC CULT: NORMAL
BASOPHILS # BLD: 0 K/UL (ref 0–0.1)
BASOPHILS NFR BLD: 0 % (ref 0–1)
BILIRUB SERPL-MCNC: 0.4 MG/DL (ref 0.2–1)
BUN SERPL-MCNC: 7 MG/DL (ref 6–20)
BUN/CREAT SERPL: 20 (ref 12–20)
CALCIUM SERPL-MCNC: 8.3 MG/DL (ref 8.5–10.1)
CHLORIDE SERPL-SCNC: 99 MMOL/L (ref 97–108)
CO2 SERPL-SCNC: 31 MMOL/L (ref 21–32)
CREAT SERPL-MCNC: 0.35 MG/DL (ref 0.7–1.3)
DIFFERENTIAL METHOD BLD: ABNORMAL
ECHO BSA: 1.73 M2
EOSINOPHIL # BLD: 0 K/UL (ref 0–0.4)
EOSINOPHIL NFR BLD: 0 % (ref 0–7)
ERYTHROCYTE [DISTWIDTH] IN BLOOD BY AUTOMATED COUNT: 12.7 % (ref 11.5–14.5)
GLOBULIN SER CALC-MCNC: 4.8 G/DL (ref 2–4)
GLUCOSE BLD STRIP.AUTO-MCNC: 135 MG/DL (ref 65–117)
GLUCOSE BLD STRIP.AUTO-MCNC: 184 MG/DL (ref 65–117)
GLUCOSE BLD STRIP.AUTO-MCNC: 221 MG/DL (ref 65–117)
GLUCOSE BLD STRIP.AUTO-MCNC: 266 MG/DL (ref 65–117)
GLUCOSE SERPL-MCNC: 130 MG/DL (ref 65–100)
HCT VFR BLD AUTO: 36 % (ref 36.6–50.3)
HGB BLD-MCNC: 12.6 G/DL (ref 12.1–17)
IMM GRANULOCYTES # BLD AUTO: 0 K/UL
IMM GRANULOCYTES NFR BLD AUTO: 0 %
LYMPHOCYTES # BLD: 2.4 K/UL (ref 0.8–3.5)
LYMPHOCYTES NFR BLD: 31 % (ref 12–49)
MCH RBC QN AUTO: 28.8 PG (ref 26–34)
MCHC RBC AUTO-ENTMCNC: 35 G/DL (ref 30–36.5)
MCV RBC AUTO: 82.4 FL (ref 80–99)
MONOCYTES # BLD: 1.3 K/UL (ref 0–1)
MONOCYTES NFR BLD: 17 % (ref 5–13)
NEUTS SEG # BLD: 4.1 K/UL (ref 1.8–8)
NEUTS SEG NFR BLD: 52 % (ref 32–75)
NRBC # BLD: 0 K/UL (ref 0–0.01)
NRBC BLD-RTO: 0 PER 100 WBC
PLATELET # BLD AUTO: 263 K/UL (ref 150–400)
PMV BLD AUTO: 9.2 FL (ref 8.9–12.9)
POTASSIUM SERPL-SCNC: 3.7 MMOL/L (ref 3.5–5.1)
PROT SERPL-MCNC: 6.6 G/DL (ref 6.4–8.2)
RBC # BLD AUTO: 4.37 M/UL (ref 4.1–5.7)
RBC MORPH BLD: ABNORMAL
SERVICE CMNT-IMP: ABNORMAL
SERVICE CMNT-IMP: NORMAL
SERVICE CMNT-IMP: NORMAL
SODIUM SERPL-SCNC: 136 MMOL/L (ref 136–145)
VAS LEFT CCA DIST EDV: 12.6 CM/S
VAS LEFT CCA DIST PSV: 88.8 CM/S
VAS LEFT CCA PROX EDV: 16 CM/S
VAS LEFT CCA PROX PSV: 132.3 CM/S
VAS LEFT ECA EDV: 6.5 CM/S
VAS LEFT ECA PSV: 101 CM/S
VAS LEFT ICA DIST EDV: 20.8 CM/S
VAS LEFT ICA DIST PSV: 63.9 CM/S
VAS LEFT ICA MID EDV: 20.8 CM/S
VAS LEFT ICA MID PSV: 65.9 CM/S
VAS LEFT ICA PROX EDV: 14.6 CM/S
VAS LEFT ICA PROX PSV: 53.1 CM/S
VAS LEFT ICA/CCA PSV: 0.7 NO UNITS
VAS LEFT VERTEBRAL EDV: 15.7 CM/S
VAS LEFT VERTEBRAL PSV: 55.3 CM/S
VAS RIGHT CCA DIST EDV: 14.7 CM/S
VAS RIGHT CCA DIST PSV: 85.1 CM/S
VAS RIGHT CCA PROX EDV: 14.7 CM/S
VAS RIGHT CCA PROX PSV: 100.8 CM/S
VAS RIGHT ECA EDV: 8.2 CM/S
VAS RIGHT ECA PSV: 112.5 CM/S
VAS RIGHT ICA DIST EDV: 18.5 CM/S
VAS RIGHT ICA DIST PSV: 59.9 CM/S
VAS RIGHT ICA MID EDV: 23.2 CM/S
VAS RIGHT ICA MID PSV: 71.6 CM/S
VAS RIGHT ICA PROX EDV: 20.1 CM/S
VAS RIGHT ICA PROX PSV: 79.6 CM/S
VAS RIGHT ICA/CCA PSV: 0.9 NO UNITS
VAS RIGHT VERTEBRAL EDV: 19.2 CM/S
VAS RIGHT VERTEBRAL PSV: 57 CM/S
WBC # BLD AUTO: 7.8 K/UL (ref 4.1–11.1)

## 2024-01-15 PROCEDURE — 82962 GLUCOSE BLOOD TEST: CPT

## 2024-01-15 PROCEDURE — 99231 SBSQ HOSP IP/OBS SF/LOW 25: CPT | Performed by: NURSE PRACTITIONER

## 2024-01-15 PROCEDURE — 6370000000 HC RX 637 (ALT 250 FOR IP)

## 2024-01-15 PROCEDURE — 97535 SELF CARE MNGMENT TRAINING: CPT

## 2024-01-15 PROCEDURE — 6370000000 HC RX 637 (ALT 250 FOR IP): Performed by: NURSE PRACTITIONER

## 2024-01-15 PROCEDURE — 6360000002 HC RX W HCPCS

## 2024-01-15 PROCEDURE — 97112 NEUROMUSCULAR REEDUCATION: CPT

## 2024-01-15 PROCEDURE — 2060000000 HC ICU INTERMEDIATE R&B

## 2024-01-15 PROCEDURE — 36415 COLL VENOUS BLD VENIPUNCTURE: CPT

## 2024-01-15 PROCEDURE — 97530 THERAPEUTIC ACTIVITIES: CPT

## 2024-01-15 PROCEDURE — 95816 EEG AWAKE AND DROWSY: CPT | Performed by: PSYCHIATRY & NEUROLOGY

## 2024-01-15 PROCEDURE — 6370000000 HC RX 637 (ALT 250 FOR IP): Performed by: PSYCHIATRY & NEUROLOGY

## 2024-01-15 PROCEDURE — 6370000000 HC RX 637 (ALT 250 FOR IP): Performed by: HOSPITALIST

## 2024-01-15 PROCEDURE — 85025 COMPLETE CBC W/AUTO DIFF WBC: CPT

## 2024-01-15 PROCEDURE — 80053 COMPREHEN METABOLIC PANEL: CPT

## 2024-01-15 PROCEDURE — 71045 X-RAY EXAM CHEST 1 VIEW: CPT

## 2024-01-15 RX ORDER — ATORVASTATIN CALCIUM 40 MG/1
40 TABLET, FILM COATED ORAL DAILY
Status: DISCONTINUED | OUTPATIENT
Start: 2024-01-15 | End: 2024-01-16 | Stop reason: HOSPADM

## 2024-01-15 RX ADMIN — CLOPIDOGREL BISULFATE 75 MG: 75 TABLET, FILM COATED ORAL at 09:23

## 2024-01-15 RX ADMIN — ENOXAPARIN SODIUM 40 MG: 100 INJECTION SUBCUTANEOUS at 09:21

## 2024-01-15 RX ADMIN — Medication 500 MG: at 18:24

## 2024-01-15 RX ADMIN — OSELTAMIVIR PHOSPHATE 75 MG: 75 CAPSULE ORAL at 09:22

## 2024-01-15 RX ADMIN — AMOXICILLIN AND CLAVULANATE POTASSIUM 1 TABLET: 875; 125 TABLET, FILM COATED ORAL at 09:27

## 2024-01-15 RX ADMIN — AMLODIPINE BESYLATE 5 MG: 5 TABLET ORAL at 09:23

## 2024-01-15 RX ADMIN — GUAIFENESIN 600 MG: 600 TABLET, EXTENDED RELEASE ORAL at 20:24

## 2024-01-15 RX ADMIN — INSULIN LISPRO 2 UNITS: 100 INJECTION, SOLUTION INTRAVENOUS; SUBCUTANEOUS at 16:49

## 2024-01-15 RX ADMIN — AMOXICILLIN AND CLAVULANATE POTASSIUM 1 TABLET: 875; 125 TABLET, FILM COATED ORAL at 20:24

## 2024-01-15 RX ADMIN — ASPIRIN 81 MG: 81 TABLET, CHEWABLE ORAL at 09:23

## 2024-01-15 RX ADMIN — ACETAMINOPHEN 650 MG: 325 TABLET ORAL at 14:22

## 2024-01-15 RX ADMIN — ATORVASTATIN CALCIUM 40 MG: 40 TABLET, FILM COATED ORAL at 09:23

## 2024-01-15 RX ADMIN — INSULIN LISPRO 7 UNITS: 100 INJECTION, SOLUTION INTRAVENOUS; SUBCUTANEOUS at 11:51

## 2024-01-15 RX ADMIN — LISINOPRIL 5 MG: 5 TABLET ORAL at 09:23

## 2024-01-15 RX ADMIN — INSULIN LISPRO 7 UNITS: 100 INJECTION, SOLUTION INTRAVENOUS; SUBCUTANEOUS at 09:22

## 2024-01-15 RX ADMIN — INSULIN GLARGINE 44 UNITS: 100 INJECTION, SOLUTION SUBCUTANEOUS at 09:21

## 2024-01-15 RX ADMIN — INSULIN LISPRO 7 UNITS: 100 INJECTION, SOLUTION INTRAVENOUS; SUBCUTANEOUS at 16:50

## 2024-01-15 RX ADMIN — Medication 500 MG: at 09:22

## 2024-01-15 RX ADMIN — GUAIFENESIN 600 MG: 600 TABLET, EXTENDED RELEASE ORAL at 09:24

## 2024-01-16 ENCOUNTER — TELEPHONE (OUTPATIENT)
Facility: HOSPITAL | Age: 59
End: 2024-01-16

## 2024-01-16 VITALS
BODY MASS INDEX: 24.22 KG/M2 | OXYGEN SATURATION: 91 % | DIASTOLIC BLOOD PRESSURE: 80 MMHG | TEMPERATURE: 98.8 F | SYSTOLIC BLOOD PRESSURE: 129 MMHG | RESPIRATION RATE: 18 BRPM | HEART RATE: 96 BPM | HEIGHT: 67 IN | WEIGHT: 154.32 LBS

## 2024-01-16 PROBLEM — I44.7 LBBB (LEFT BUNDLE BRANCH BLOCK): Status: RESOLVED | Noted: 2024-01-11 | Resolved: 2024-01-16

## 2024-01-16 PROBLEM — J11.1 FLU SYNDROME: Status: RESOLVED | Noted: 2024-01-09 | Resolved: 2024-01-16

## 2024-01-16 PROBLEM — R41.82 ALTERED MENTAL STATUS: Status: RESOLVED | Noted: 2024-01-15 | Resolved: 2024-01-16

## 2024-01-16 PROBLEM — E11.65 POORLY CONTROLLED DIABETES MELLITUS (HCC): Status: RESOLVED | Noted: 2024-01-09 | Resolved: 2024-01-16

## 2024-01-16 PROBLEM — I10 ESSENTIAL HYPERTENSION: Status: RESOLVED | Noted: 2024-01-15 | Resolved: 2024-01-16

## 2024-01-16 PROBLEM — R11.2 NAUSEA AND VOMITING: Status: RESOLVED | Noted: 2024-01-09 | Resolved: 2024-01-16

## 2024-01-16 PROBLEM — G93.40 ENCEPHALOPATHY ACUTE: Status: RESOLVED | Noted: 2024-01-15 | Resolved: 2024-01-16

## 2024-01-16 PROBLEM — E11.10 DKA, TYPE 2, NOT AT GOAL (HCC): Status: RESOLVED | Noted: 2024-01-09 | Resolved: 2024-01-16

## 2024-01-16 LAB
GLUCOSE BLD STRIP.AUTO-MCNC: 217 MG/DL (ref 65–117)
GLUCOSE BLD STRIP.AUTO-MCNC: 249 MG/DL (ref 65–117)
SERVICE CMNT-IMP: ABNORMAL
SERVICE CMNT-IMP: ABNORMAL

## 2024-01-16 PROCEDURE — 97530 THERAPEUTIC ACTIVITIES: CPT

## 2024-01-16 PROCEDURE — 82962 GLUCOSE BLOOD TEST: CPT

## 2024-01-16 PROCEDURE — 99231 SBSQ HOSP IP/OBS SF/LOW 25: CPT | Performed by: NURSE PRACTITIONER

## 2024-01-16 PROCEDURE — 6370000000 HC RX 637 (ALT 250 FOR IP): Performed by: CLINICAL NURSE SPECIALIST

## 2024-01-16 PROCEDURE — 6370000000 HC RX 637 (ALT 250 FOR IP): Performed by: HOSPITALIST

## 2024-01-16 PROCEDURE — 6370000000 HC RX 637 (ALT 250 FOR IP)

## 2024-01-16 PROCEDURE — 6360000002 HC RX W HCPCS

## 2024-01-16 PROCEDURE — 97535 SELF CARE MNGMENT TRAINING: CPT

## 2024-01-16 PROCEDURE — 95816 EEG AWAKE AND DROWSY: CPT

## 2024-01-16 PROCEDURE — 6370000000 HC RX 637 (ALT 250 FOR IP): Performed by: PSYCHIATRY & NEUROLOGY

## 2024-01-16 RX ORDER — AMOXICILLIN AND CLAVULANATE POTASSIUM 875; 125 MG/1; MG/1
1 TABLET, FILM COATED ORAL EVERY 12 HOURS SCHEDULED
Qty: 4 TABLET | Refills: 0 | Status: SHIPPED | OUTPATIENT
Start: 2024-01-16 | End: 2024-01-18

## 2024-01-16 RX ORDER — INSULIN LISPRO 100 [IU]/ML
8 INJECTION, SOLUTION INTRAVENOUS; SUBCUTANEOUS
Status: DISCONTINUED | OUTPATIENT
Start: 2024-01-16 | End: 2024-01-16

## 2024-01-16 RX ORDER — ATORVASTATIN CALCIUM 80 MG/1
40 TABLET, FILM COATED ORAL DAILY
Qty: 30 TABLET | Refills: 3 | Status: SHIPPED | OUTPATIENT
Start: 2024-01-16

## 2024-01-16 RX ORDER — CLOPIDOGREL BISULFATE 75 MG/1
75 TABLET ORAL DAILY
Qty: 30 TABLET | Refills: 3 | Status: SHIPPED | OUTPATIENT
Start: 2024-01-17

## 2024-01-16 RX ORDER — INSULIN GLARGINE 100 [IU]/ML
44 INJECTION, SOLUTION SUBCUTANEOUS NIGHTLY
Qty: 5 ADJUSTABLE DOSE PRE-FILLED PEN SYRINGE | Refills: 3 | Status: SHIPPED | OUTPATIENT
Start: 2024-01-16

## 2024-01-16 RX ORDER — INSULIN LISPRO 100 [IU]/ML
9 INJECTION, SOLUTION INTRAVENOUS; SUBCUTANEOUS
Qty: 15 ML | Refills: 1 | Status: SHIPPED | OUTPATIENT
Start: 2024-01-16

## 2024-01-16 RX ORDER — INSULIN LISPRO 100 [IU]/ML
9 INJECTION, SOLUTION INTRAVENOUS; SUBCUTANEOUS
Status: DISCONTINUED | OUTPATIENT
Start: 2024-01-16 | End: 2024-01-16 | Stop reason: HOSPADM

## 2024-01-16 RX ORDER — PEN NEEDLE, DIABETIC 30 GX5/16"
1 NEEDLE, DISPOSABLE MISCELLANEOUS DAILY
Qty: 100 EACH | Refills: 3 | Status: SHIPPED | OUTPATIENT
Start: 2024-01-16

## 2024-01-16 RX ORDER — INSULIN LISPRO 100 [IU]/ML
10 INJECTION, SOLUTION INTRAVENOUS; SUBCUTANEOUS
Status: DISCONTINUED | OUTPATIENT
Start: 2024-01-16 | End: 2024-01-16

## 2024-01-16 RX ADMIN — INSULIN LISPRO 10 UNITS: 100 INJECTION, SOLUTION INTRAVENOUS; SUBCUTANEOUS at 11:57

## 2024-01-16 RX ADMIN — INSULIN LISPRO 2 UNITS: 100 INJECTION, SOLUTION INTRAVENOUS; SUBCUTANEOUS at 08:21

## 2024-01-16 RX ADMIN — AMLODIPINE BESYLATE 5 MG: 5 TABLET ORAL at 08:20

## 2024-01-16 RX ADMIN — INSULIN LISPRO 7 UNITS: 100 INJECTION, SOLUTION INTRAVENOUS; SUBCUTANEOUS at 08:21

## 2024-01-16 RX ADMIN — GUAIFENESIN 600 MG: 600 TABLET, EXTENDED RELEASE ORAL at 08:20

## 2024-01-16 RX ADMIN — INSULIN GLARGINE 44 UNITS: 100 INJECTION, SOLUTION SUBCUTANEOUS at 08:21

## 2024-01-16 RX ADMIN — ATORVASTATIN CALCIUM 40 MG: 40 TABLET, FILM COATED ORAL at 08:20

## 2024-01-16 RX ADMIN — ENOXAPARIN SODIUM 40 MG: 100 INJECTION SUBCUTANEOUS at 08:20

## 2024-01-16 RX ADMIN — CLOPIDOGREL BISULFATE 75 MG: 75 TABLET, FILM COATED ORAL at 08:20

## 2024-01-16 RX ADMIN — ASPIRIN 81 MG: 81 TABLET, CHEWABLE ORAL at 08:20

## 2024-01-16 RX ADMIN — AMOXICILLIN AND CLAVULANATE POTASSIUM 1 TABLET: 875; 125 TABLET, FILM COATED ORAL at 08:19

## 2024-01-16 RX ADMIN — LISINOPRIL 5 MG: 5 TABLET ORAL at 08:20

## 2024-01-16 RX ADMIN — INSULIN LISPRO 2 UNITS: 100 INJECTION, SOLUTION INTRAVENOUS; SUBCUTANEOUS at 11:56

## 2024-01-16 RX ADMIN — Medication 500 MG: at 08:19

## 2024-01-16 NOTE — TELEPHONE ENCOUNTER
Pt needs a hospital follow up appointment  Provider: Any   Location: [facility]  In person or virtual: in person  When: 4-6 weeks  Diagnosis/reason for follow up:  acute stroke  Additional information:

## 2024-01-16 NOTE — PLAN OF CARE
Problem: Chronic Conditions and Co-morbidities  Goal: Patient's chronic conditions and co-morbidity symptoms are monitored and maintained or improved  Recent Flowsheet Documentation  Taken 1/13/2024 2000 by Silvana Medina RN  Care Plan - Patient's Chronic Conditions and Co-Morbidity Symptoms are Monitored and Maintained or Improved: Monitor and assess patient's chronic conditions and comorbid symptoms for stability, deterioration, or improvement     Problem: Discharge Planning  Goal: Discharge to home or other facility with appropriate resources  Recent Flowsheet Documentation  Taken 1/13/2024 2000 by Silvana Medina RN  Discharge to home or other facility with appropriate resources: Identify barriers to discharge with patient and caregiver     
  Problem: Occupational Therapy - Adult  Goal: By Discharge: Performs self-care activities at highest level of function for planned discharge setting.  See evaluation for individualized goals.  Description: FUNCTIONAL STATUS PRIOR TO ADMISSION:  patient reporting living with wife and children and being IND with ADLs and mobility at baseline. Patient reporting no use of DME at baseline.     Occupational Therapy Goals:  Initiated 1/14/2024  1.  Patient will perform grooming in standing with Contact Guard Assist within 7 day(s).  2.  Patient will perform bathing using most appropriate DME with Contact Guard Assist within 7 day(s).  3.  Patient will perform lower body dressing with Contact Guard Assist within 7 day(s).  4.  Patient will perform toilet transfers with Contact Guard Assist  within 7 day(s).  5.  Patient will perform all aspects of toileting with Contact Guard Assist within 7 day(s).  6.  Patient will participate in upper extremity therapeutic exercise/activities with Supervision for 5 minutes within 7 day(s).    7.  Patient will utilize energy conservation techniques during functional activities with verbal cues within 7 day(s).  Outcome: Progressing   OCCUPATIONAL THERAPY TREATMENT  Patient: Abner Willams (58 y.o. male)  Date: 1/15/2024  Primary Diagnosis: Tachycardia [R00.0]  Influenza A [J10.1]  Encephalopathy [G93.40]  DKA, type 2, not at goal (HCC) [E11.10]  Altered mental status, unspecified altered mental status type [R41.82]  Diabetic ketoacidosis without coma associated with diabetes mellitus due to underlying condition (HCC) [E08.10]  Upper respiratory tract infection, unspecified type [J06.9]  Nausea and vomiting, unspecified vomiting type [R11.2]       Precautions:                  Chart, occupational therapy assessment, plan of care, and goals were reviewed.    ASSESSMENT  Patient continues to benefit from skilled OT services and is slowly progressing towards goals. Patient with increased 
  Problem: Physical Therapy - Adult  Goal: By Discharge: Performs mobility at highest level of function for planned discharge setting.  See evaluation for individualized goals.  Description: FUNCTIONAL STATUS PRIOR TO ADMISSION: Patient was independent and active without use of DME.    HOME SUPPORT PRIOR TO ADMISSION: The patient lived with spouse and children but did not require assistance.    Physical Therapy Goals  Initiated 1/13/2024  1.  Patient will move from supine to sit and sit to supine in bed with minimal assistance within 7 day(s).    2.  Patient will perform sit to stand with minimal assistance x1 within 7 day(s).  3.  Patient will transfer from bed to chair and chair to bed with minimal assistance x1 using the least restrictive device within 7 day(s).  4.  Patient will ambulate with minimal assistance for 25 feet with the least restrictive device within 7 day(s).   5.  Patient will ascend/descend 6 stairs with 1 handrail(s) with minimal assistance within 7 day(s).  6.  Patient will improve Adler Balance score by 7 points within 7 days.     Outcome: Progressing   PHYSICAL THERAPY EVALUATION    Patient: Abner Willams (58 y.o. male)  Date: 1/13/2024  Primary Diagnosis: Tachycardia [R00.0]  Influenza A [J10.1]  Encephalopathy [G93.40]  DKA, type 2, not at goal (HCC) [E11.10]  Altered mental status, unspecified altered mental status type [R41.82]  Diabetic ketoacidosis without coma associated with diabetes mellitus due to underlying condition (HCC) [E08.10]  Upper respiratory tract infection, unspecified type [J06.9]  Nausea and vomiting, unspecified vomiting type [R11.2]       Precautions:                        ASSESSMENT :   DEFICITS/IMPAIRMENTS:   The patient is limited by decreased functional mobility, strength, activity tolerance, coordination, balance S/P admission for HTN and DKA. His Pmhx is [employer]ab[employer] for CVA with residual peripheral vision loss and DM II. Patient continued to demonstrate AMS and 
  Problem: Physical Therapy - Adult  Goal: By Discharge: Performs mobility at highest level of function for planned discharge setting.  See evaluation for individualized goals.  Description: FUNCTIONAL STATUS PRIOR TO ADMISSION: Patient was independent and active without use of DME.    HOME SUPPORT PRIOR TO ADMISSION: The patient lived with spouse and children but did not require assistance.    Physical Therapy Goals  Initiated 1/13/2024  1.  Patient will move from supine to sit and sit to supine in bed with minimal assistance within 7 day(s).    2.  Patient will perform sit to stand with minimal assistance x1 within 7 day(s).  3.  Patient will transfer from bed to chair and chair to bed with minimal assistance x1 using the least restrictive device within 7 day(s).  4.  Patient will ambulate with minimal assistance for 25 feet with the least restrictive device within 7 day(s).   5.  Patient will ascend/descend 6 stairs with 1 handrail(s) with minimal assistance within 7 day(s).  6.  Patient will improve Adler Balance score by 7 points within 7 days.     Outcome: Progressing   PHYSICAL THERAPY TREATMENT    Patient: Abner Willams (58 y.o. male)  Date: 1/15/2024  Diagnosis: Tachycardia [R00.0]  Influenza A [J10.1]  Encephalopathy [G93.40]  DKA, type 2, not at goal (HCC) [E11.10]  Altered mental status, unspecified altered mental status type [R41.82]  Diabetic ketoacidosis without coma associated with diabetes mellitus due to underlying condition (HCC) [E08.10]  Upper respiratory tract infection, unspecified type [J06.9]  Nausea and vomiting, unspecified vomiting type [R11.2] DKA, type 2, not at goal (HCC)      Precautions:                      ASSESSMENT:  Patient continues to benefit from skilled PT services and is progressing towards goals. Patient received sitting side of bed with OT. Pt appearing confused requiring moderate cues. Noted posterior LOB upon standing and provided RW. Pt with difficulty moving RLE 
  Problem: Safety - Medical Restraint  Goal: Remains free of injury from restraints (Restraint for Interference with Medical Device)  Description: INTERVENTIONS:  1. Determine that other, less restrictive measures have been tried or would not be effective before applying the restraint  2. Evaluate the patient's condition at the time of restraint application  3. Inform patient/family regarding the reason for restraint  4. Q2H: Monitor safety, psychosocial status, comfort, nutrition and hydration  1/10/2024 0941 by Mian Cha RN  Outcome: Progressing     Problem: Safety - Adult  Goal: Free from fall injury  1/10/2024 2035 by Kika Singh RN  Outcome: Progressing  Flowsheets (Taken 1/10/2024 2035)  Free From Fall Injury: Instruct family/caregiver on patient safety  1/10/2024 0941 by Mian Cha RN  Outcome: Progressing     Problem: Discharge Planning  Goal: Discharge to home or other facility with appropriate resources  Outcome: Progressing  Flowsheets (Taken 1/10/2024 2035)  Discharge to home or other facility with appropriate resources: Identify barriers to discharge with patient and caregiver     Problem: Pain  Goal: Verbalizes/displays adequate comfort level or baseline comfort level  Outcome: Progressing  Flowsheets (Taken 1/10/2024 2035)  Verbalizes/displays adequate comfort level or baseline comfort level: Encourage patient to monitor pain and request assistance     Problem: Chronic Conditions and Co-morbidities  Goal: Patient's chronic conditions and co-morbidity symptoms are monitored and maintained or improved  Outcome: Progressing  Flowsheets (Taken 1/10/2024 2035)  Care Plan - Patient's Chronic Conditions and Co-Morbidity Symptoms are Monitored and Maintained or Improved: Monitor and assess patient's chronic conditions and comorbid symptoms for stability, deterioration, or improvement     
  Problem: Safety - Medical Restraint  Goal: Remains free of injury from restraints (Restraint for Interference with Medical Device)  Description: INTERVENTIONS:  1. Determine that other, less restrictive measures have been tried or would not be effective before applying the restraint  2. Evaluate the patient's condition at the time of restraint application  3. Inform patient/family regarding the reason for restraint  4. Q2H: Monitor safety, psychosocial status, comfort, nutrition and hydration  1/15/2024 2155 by Lucia Fitch RN  Outcome: Progressing     Problem: Discharge Planning  Goal: Discharge to home or other facility with appropriate resources  1/15/2024 2155 by Lucia Fitch RN  Outcome: Progressing  Flowsheets (Taken 1/15/2024 0800 by Ebonie Block RN)  Discharge to home or other facility with appropriate resources: Identify barriers to discharge with patient and caregiver     Problem: Pain  Goal: Verbalizes/displays adequate comfort level or baseline comfort level  1/15/2024 2155 by Lucia Fitch RN  Outcome: Progressing     Problem: Chronic Conditions and Co-morbidities  Goal: Patient's chronic conditions and co-morbidity symptoms are monitored and maintained or improved  1/15/2024 2155 by Lucia Fitch RN  Outcome: Progressing  Flowsheets  Taken 1/15/2024 2020 by Lucia Fitch RN  Care Plan - Patient's Chronic Conditions and Co-Morbidity Symptoms are Monitored and Maintained or Improved:   Monitor and assess patient's chronic conditions and comorbid symptoms for stability, deterioration, or improvement   Collaborate with multidisciplinary team to address chronic and comorbid conditions and prevent exacerbation or deterioration   Update acute care plan with appropriate goals if chronic or comorbid symptoms are exacerbated and prevent overall improvement and discharge  Taken 1/15/2024 0800 by Ebonie Block RN  Care Plan - Patient's Chronic Conditions and 
  Problem: Safety - Medical Restraint  Goal: Remains free of injury from restraints (Restraint for Interference with Medical Device)  Description: INTERVENTIONS:  1. Determine that other, less restrictive measures have been tried or would not be effective before applying the restraint  2. Evaluate the patient's condition at the time of restraint application  3. Inform patient/family regarding the reason for restraint  4. Q2H: Monitor safety, psychosocial status, comfort, nutrition and hydration  Outcome: Progressing     Problem: Safety - Adult  Goal: Free from fall injury  Outcome: Progressing     
  Problem: Safety - Medical Restraint  Goal: Remains free of injury from restraints (Restraint for Interference with Medical Device)  Description: INTERVENTIONS:  1. Determine that other, less restrictive measures have been tried or would not be effective before applying the restraint  2. Evaluate the patient's condition at the time of restraint application  3. Inform patient/family regarding the reason for restraint  4. Q2H: Monitor safety, psychosocial status, comfort, nutrition and hydration  Outcome: Progressing     Problem: Safety - Adult  Goal: Free from fall injury  Outcome: Progressing     Problem: Discharge Planning  Goal: Discharge to home or other facility with appropriate resources  Outcome: Progressing     Problem: Pain  Goal: Verbalizes/displays adequate comfort level or baseline comfort level  Outcome: Progressing     Problem: Chronic Conditions and Co-morbidities  Goal: Patient's chronic conditions and co-morbidity symptoms are monitored and maintained or improved  Outcome: Progressing     Problem: Skin/Tissue Integrity  Goal: Absence of new skin breakdown  Description: 1.  Monitor for areas of redness and/or skin breakdown  2.  Assess vascular access sites hourly  3.  Every 4-6 hours minimum:  Change oxygen saturation probe site  4.  Every 4-6 hours:  If on nasal continuous positive airway pressure, respiratory therapy assess nares and determine need for appliance change or resting period.  Outcome: Progressing     Problem: Nutrition Deficit:  Goal: Optimize nutritional status  Outcome: Progressing     
  Problem: Safety - Medical Restraint  Goal: Remains free of injury from restraints (Restraint for Interference with Medical Device)  Description: INTERVENTIONS:  1. Determine that other, less restrictive measures have been tried or would not be effective before applying the restraint  2. Evaluate the patient's condition at the time of restraint application  3. Inform patient/family regarding the reason for restraint  4. Q2H: Monitor safety, psychosocial status, comfort, nutrition and hydration  Outcome: Progressing     Problem: Safety - Adult  Goal: Free from fall injury  Outcome: Progressing     Problem: Discharge Planning  Goal: Discharge to home or other facility with appropriate resources  Outcome: Progressing  Flowsheets (Taken 1/13/2024 1315)  Discharge to home or other facility with appropriate resources: Identify barriers to discharge with patient and caregiver     Problem: Pain  Goal: Verbalizes/displays adequate comfort level or baseline comfort level  Outcome: Progressing     Problem: Chronic Conditions and Co-morbidities  Goal: Patient's chronic conditions and co-morbidity symptoms are monitored and maintained or improved  Outcome: Progressing  Flowsheets (Taken 1/13/2024 5463)  Care Plan - Patient's Chronic Conditions and Co-Morbidity Symptoms are Monitored and Maintained or Improved: Monitor and assess patient's chronic conditions and comorbid symptoms for stability, deterioration, or improvement     Problem: Skin/Tissue Integrity  Goal: Absence of new skin breakdown  Description: 1.  Monitor for areas of redness and/or skin breakdown  2.  Assess vascular access sites hourly  3.  Every 4-6 hours minimum:  Change oxygen saturation probe site  4.  Every 4-6 hours:  If on nasal continuous positive airway pressure, respiratory therapy assess nares and determine need for appliance change or resting period.  Outcome: Progressing     Problem: Nutrition Deficit:  Goal: Optimize nutritional status  Outcome: 
Patient under droplet isolation is stable, no reports of pain or signs of acute distress upon assessment. Patient re-educated on treatment plan to receive EEG in AM prior to discharge home with spouse tomorrow. External urinary catheter in use, no bowel movement overnight. Medications tolerated well, no complaints at this time. Safety measures in place, monitoring ongoing.  
assistance  Functional Mobility Skilled Clinical Factors: heavy Mod A required for functional mobility to the bathroom with no AD with frequent LOB to the R>L and reaching out for external support; continued to be Min-Mod A for balance to return to EOB with RW, mod-max cues for RW management & safety; MAX multimodl cues for attention to the R with all functional tasks, fair-poor carryover     Skin Care: N/A      Pain Ratin/10   Pain Intervention(s):   nursing notified and repositioning      Activity Tolerance:   Fair   Please refer to the flowsheet for vital signs taken during this treatment.    After treatment:   Patient left in no apparent distress in bed, Call bell within reach, Bed/ chair alarm activated, and Side rails x3    COMMUNICATION/EDUCATION:   The patient's plan of care was discussed with: physical therapist, registered nurse, and     Patient Education  Education Given To: Patient  Education Provided: Role of Therapy;Transfer Training;Orientation;Fall Prevention Strategies;Plan of Care;Precautions;ADL Adaptive Strategies;Low Vision Education  Education Method: Demonstration;Verbal;Teach Back  Barriers to Learning: Cognition;Vision  Education Outcome: Unable to demonstrate understanding;Continued education needed    Thank you for this referral.  LÁZARO Bartholomew, OTR/L  Minutes: 38    
degrees): Full  Shoulder Flexion ( degrees): Full  Pronation/Supination: Full  Subtotal: 6    Normal Reflex Activity  Biceps, Triceps, Finger Flexors: Full  Subtotal: 2    Upper Extremity Total   Upper Extremity Total: 36    Wrist  Stability at 15 Degree Dorsiflexion: Full  Repeated Dorsiflexion/ Volar Flexion: Full  Stability at 15 Degree Dorsiflexion: Full  Repeated Dorsiflexion/ Volar Flexion: Full  Circumduction: Full  Wrist Total: 10    Hand  Mass Flexion: Full  Mass Extension: Full  Grasp A: Full  Grasp B: Full  Grasp C: Full  Grasp D: Full  Grasp E: Full  Hand Total: 14    Coordination/Speed  Tremor: None  Dysmetria: Slight  Time: <1s  Coordination/Speed Total: 5    Total A-D  Total A-D (Motor Function): 65         This is a reliable/valid measure of arm function after a neurological event. It has established value to characterize functional status and for measuring spontaneous and therapy-induced recovery; tests proximal and distal motor functions. Fugl-Sinclair Assessment - UE scores recorded between five and 30 days post neurologic event can be used to predict UE recovery at six months post neurologic event.  Severe = 0-21 points   Moderately Severe = 22-33 points   Moderate = 34-47 points   Mild = 48-66 points  MURTAZA Milton, JAIDEN Xiao, NICK Lenz, TOMEKA Coronado, & CHE Ramos (1992). Measurement of motor recovery after stroke: Outcome assessment and sample size requirements. Stroke, 23, pp. 3337-2839.   --------------------------------------------------------------------------------------------------------------------------------------------------------------------  MCID:  Stroke:   (Vidal et al, 2001; n = 171; mean age 70 (11) years; assessed within 17 (12) days of stroke, Acute Stroke)  FMA Motor Scores from Admission to Discharge   10 point increase in FMA Upper Extremity = 1.5 change in discharge FIM   10 point increase in FMA Lower Extremity = 1.9 change in discharge FIM  MDC:   Stroke:

## 2024-01-16 NOTE — CARE COORDINATION
Transition of Care Plan:    Home with spouse  - OP PT/OT/SLP - [facility] (1/25)  - DME: RW ordered through [employer]/delivered to bedside    Transport: Spouse    RUR: 16%  Prior Level of Functioning: Independent  Disposition: home - OP therapy  Follow up appointments: PCP   DME needed: RW  Transportation at discharge: Spouse  Caregiver Contact: Spouse, Maryjo Willams [phone]   Discharge Caregiver contacted prior to discharge? Yes  Care Conference needed? No  Barriers to discharge: Medical, EEG    CM spoke with Pt's spouse regarding discharge plan; preference is for Pt to return home with family support and follow up with OP PT/OT/SLP at [facility]. RW recommended, spouse in agreement to have ordered.     OP PT/OT/SLP appt scheduled 1/25 at [facility]. Follow up info placed on AVS.    RW ordered through [employer] via [place].    1615: Provider signature obtained for RW and delivered to bedside. OP script for therapy placed on chart for provider to sign. CARLO Kelly (Ally)S.RIKKI.     
Transition of Care Plan:    Home with spouse  - OP PT/OT/SLP - [facility] (1/25)  - DME: RW ordered through [employer]/delivered to bedside; BSC ordered for home delivery    Transport: Spouse - 1600    RUR: 16%  Prior Level of Functioning: Independent  Disposition: home - OP therapy  Follow up appointments: PCP   DME needed: RW  Transportation at discharge: Spouse  Caregiver Contact: Spouse, Maryjo Willams [phone]   Discharge Caregiver contacted prior to discharge? Yes  Care Conference needed? No  Barriers to discharge: None    Pt spouse will pick Pt up at 1600.    1515: Per OT, Pt needs BSC ordered. Order placed through [employer] via [place] for home delivery.    Both F2F required by Medicaid for BSC and RW signed by Dr. Carroll and uploaded into [place] for [employer].    CARLO Rob (Ally)S.W.     
Manager l [facility]  Available via [employer] or

## 2024-01-16 NOTE — DIABETES MGMT
Addendum  created 12/15/20 1538 by Guicho Villareal MD    Clinical Note Signed      
[facility]  DIABETES MANAGEMENT CONSULT    Consulted by Provider for advanced nursing evaluation and care for inpatient blood glucose management.    Evaluation and Action Plan   This 58 year old AA male is being evaluated in the ER today 1/9/24 after experiencing N/V/D accompanied by congestion & cough X3 days. Found to be Flu + and in DKA. Admission Bg 472 with AG of 21. Receiving IVFs and insulin by infusion. As for plans going forward, this gentleman was advised that his body is depleting his fat & muscle stores in order to provide his body with fuel. He does not know how much weight he has lost but at an OP visit, in 3/2022, he weighed 151. That would indicate a 20 lb weight loss.     Management Rationale Action Plan   Medication   Basal needs Based on  [x]        DKA protocol     Insulin infusion until BG <250mg/dl and AG has closed X2. At that point, dextrose infusion should be started    Transitioning off infusion should occur when his insulin rate has been steady for at least 6 hours    Based on his weight & BMI, would give Lantus insulin 12-15 units as transition dose & then D     Nutritional needs Based on  []        Blood glucose pattern  [x]        CHO load  []        Enteral feeding CHO load  []        TPN/PPN dextrose load     When he is permitted food, would give 4 units if prescribed a 45 gm CHO meal; if he is prescribed a 60 gm CHO meal, would give 5 units with consumed meal   Corrective insulin Based on sensitivity  [x]        Low   []        Medium  []        High     Once off insulin infusion   Additional orders        Diabetes Discharge Plan   Medication  TBD     Referral  []        Outpatient diabetes education   Additional orders            Initial Presentation   Abner Willams is a 58 y.o. male being evaluated in the ER 1/9/24 after experiencing N/V/D accompanied by congestion & cough X3 days. Admits to poor appetite and wife noted confusion. Denies 
[facility]  DIABETES MANAGEMENT CONSULT    Consulted by Provider for advanced nursing evaluation and care for inpatient blood glucose management.    Evaluation and Action Plan   This 58 year old AA male was evaluated in the ER 1/9/24 after experiencing N/V/D accompanied by congestion & cough X3 days. Found to be Flu + and in DKA. Admission Bg 472 with AG of 21. Received IVFs and insulin infusion. Patient had been given transition dose of basal insulin, and insulin & dextrose infusions stopped. Patient continues to not each much per wife. Is consuming Glucerna & fruit,    This gentleman was advised in the ER and again yesterday 1/10/24 that his body is depleting his fat & muscle stores in order to provide his body with fuel. According to the chart, he has experienced a 20 lb weight loss since 3/2022. Since patient continues to not have an appetite, will concentrate on optimizing basal insulin dose. Since he is eating, will add mealtime insulin.    Management Rationale Action Plan   Medication   Basal needs Based on  [x]        DKA protocol     Increase Lantus insulin to 35 units D     Nutritional needs Based on  []        Blood glucose pattern  [x]        CHO load (75gm)  []        Enteral feeding CHO load  []        TPN/PPN dextrose load     Begin 3 units of Humalog insulin with meals   Corrective insulin Based on sensitivity  [x]        Low   []        Medium  []        High      Additional orders        Diabetes Discharge Plan   Medication  TBD     Referral  []        Outpatient diabetes education   Additional orders            Initial Presentation   Abner Willams is a 58 y.o. male being evaluated in the ER 1/9/24 after experiencing N/V/D accompanied by congestion & cough X3 days. Admits to poor appetite and wife noted confusion. Denies fever.  Afebrile. Tachycardic/SR. Normotensive  ER exam:  Neurological:      Mental Status: He is lethargic and disoriented.  Cardiovascular:      
[facility]  DIABETES MANAGEMENT CONSULT    Consulted by Provider for advanced nursing evaluation and care for inpatient blood glucose management.    Evaluation and Action Plan   This 58 year old AA male was evaluated in the ER 1/9/24 after experiencing N/V/D accompanied by congestion & cough X3 days. Found to be Flu + and in DKA. Admission Bg 472 with AG of 21. Received IVFs and insulin infusion. Patient had been given transition dose of basal insulin, and insulin & dextrose infusions stopped. Patient is eating but not much per wife.     This gentleman was advised in the ER and again yesterday 1/10/24 that his body is depleting his fat & muscle stores in order to provide his body with fuel. According to the chart, he has experienced a 20 lb weight loss since 3/2022. Since patient continues to not have an appetite, will concentrate on optimizing basal insulin dose. Will wait to add mealtime.    Management Rationale Action Plan   Medication   Basal needs Based on  [x]        DKA protocol     Increase Lantus insulin to 25 units D     Nutritional needs Based on  []        Blood glucose pattern  [x]        CHO load (75gm)  []        Enteral feeding CHO load  []        TPN/PPN dextrose load     Once he is eating >50% of meals, will begin 3-4 units of Humalog insulin   Corrective insulin Based on sensitivity  [x]        Low   []        Medium  []        High      Additional orders        Diabetes Discharge Plan   Medication  TBD     Referral  []        Outpatient diabetes education   Additional orders            Initial Presentation   Abner Willams is a 58 y.o. male being evaluated in the ER 1/9/24 after experiencing N/V/D accompanied by congestion & cough X3 days. Admits to poor appetite and wife noted confusion. Denies fever.  Afebrile. Tachycardic/SR. Normotensive  ER exam:  Neurological:      Mental Status: He is lethargic and disoriented.  Cardiovascular:      Rate and Rhythm: Tachycardia 
Mental Status: He is lethargic and disoriented.  Cardiovascular:      Rate and Rhythm: Tachycardia present.      Pulses: Normal pulses.   Pulmonary:      Effort: Pulmonary effort is normal. No respiratory distress.   LAB: /AG 21. JERICA. Normal liver enzymes. RBC, H&H elevated. Normal platelets. Flu +. COVID negative. Lactic 2.6. A1c 12.7%  CXR: No acute process  CT Head: No acute process    HX:  Past Medical History:   Diagnosis Date    Diabetes ([facility])     Hypertension     Psoriasis       INITIAL DX: Tachycardia [R00.0]  Influenza A [J10.1]  Encephalopathy [G93.40]  DKA, type 2, not at goal ([facility]) [E11.10]  Altered mental status, unspecified altered mental status type [R41.82]  Diabetic ketoacidosis without coma associated with diabetes mellitus due to underlying condition ([facility]) [E08.10]  Upper respiratory tract infection, unspecified type [J06.9]  Nausea and vomiting, unspecified vomiting type [R11.2]     Current Treatment     TX: Abx. Amio. BP med. Lipitor. Clot prevention. Insulin     Hospital Course   Clinical progress has been uncomplicated.   1/10/24 Alert this morning. 02NC. Eating now. Wife at bedside  1/11/24 A&O. Talking today. Off 02. Coughing+. Not eating much.Wife at bedside  1/12/24 Sleeping. Off 02. Wife at bedside who reported he is drinking Glucerna & eating fruit.   1/16/24 A&O. Significant congested cough. Eating all tray per nursing. OOB to chair.    Diabetes History   Type 2 diabetes, diagnosed 20 years ago, originally treated with metformin (which he did not tolerate due to GI upset). Denied weight loss at time of diagnosis  Next medication (after metformin) used was insulin  Wife states that patient isn't taking any diabetes medication at this time & wants to control his diabetes with diet  Family history of diabetes positive in father & grandparents      Diabetes-related Medical History  Acute complications  DKA  Neurological complications  Peripheral neuropathy  Loss of peripheral vision 
diabetes-related events  1/9/24 Admission  => insulin infusion. A1c 12.7%  1/10/24 Using >4 units of insulin/hr at time of transition. Lantus insulin 14 units    Assessment and Nursing Intervention   Nursing Diagnosis Risk for unstable blood glucose pattern   Nursing Intervention Domain 5250 Decision-making Support   Nursing Interventions Examined current inpatient diabetes/blood glucose control   Explored factors facilitating and impeding inpatient management  Explored corrective strategies with patient and responsible inpatient provider   Informed patient of rational for insulin strategy while hospitalized     Nursing Diagnosis 12560 Ineffective Health Management   Nursing Intervention Domain 5250 Decision-making Support   Nursing Interventions Identified diabetes self-management practices impeding diabetes control  Discussed diabetes survival skills related to  Weight loss due to insulin deficiency pointing to need for regular insulin administration     Billing Code(s)   [] 31035 IP subsequent hospital care - 50 minutes   [x] 69299 IP subsequent hospital care - 35 minutes   [] 00678 IP subsequent hospital care - 25 minutes   [] 73040 IP initial hospital care - 40 minutes     Before making these care recommendations, I personally reviewed the hospitalization record, including notes, laboratory & diagnostic data and current medications, and examined the patient at the bedside (circumstances permitting) before determining care. More than fifty (50) percent of the time was spent in patient counseling and/or care coordination.  Total minutes: 35    TATY Stein - CNS  Clinical Nurse Specialist - Diabetes & endocrine disorders  [facility]  Access via [employer]

## 2024-01-16 NOTE — DISCHARGE SUMMARY
Discharge Summary       PATIENT ID: Abner Willams  MRN: [id number]   YOB: 1965    DATE OF ADMISSION: 1/9/2024  9:39 AM    DATE OF DISCHARGE: 1/16/2024   PRIMARY CARE PROVIDER: Thaddeus Galvez MD     ATTENDING PHYSICIAN: Dr Steven Carroll  DISCHARGING PROVIDER: Steven Carroll MD    To contact this individual call [phone] and ask the  to page.  If unavailable ask to be transferred the Adult Hospitalist Department.    CONSULTATIONS: IP CONSULT TO DIABETES EDUCATOR  IP CONSULT TO CARDIOLOGY  IP CONSULT TO NEUROLOGY  IP CONSULT TO PHYSICAL THERAPY  IP CONSULT TO OCCUPATIONAL THERAPY    PROCEDURES/SURGERIES: * No surgery found *    ADMITTING DIAGNOSES & HOSPITAL COURSE:   Type 2 DM w/ DKA  -transitioned insulin infusion to sc Lantus. Increased lantus today from 35 to 42 units  to 44 units also pre meal dose of Insulin increased from 3 to 7 units before meals no premeal Insulin if he is NPO   -SSI/POC checks  -advance diet if able  -Speech consulted  again discussed with RN to call speech to evaluate for aspiration as he still has cough and Ct showed pneumonia.     Sepsis  Acute hypoxic respiratory failure: Improved  Influenza A  Likely viral pneumonia  -CT chest consolidative opacities bilateral lung  -blood cultures no growth so far  -on Tamiflu  -CXR 1/15 improved  -He was on Cefepime. Due to concerns of Neurotoxixity was stopped, now on Augmentin   -repeat CT chest in 3-6 months    Acute stroke : He has expressive aphasia due to stroke also slightly confused ?  -MRI brain suggestive of acute stroke has expressive aphasia   -on ASA/high dose statin   add Plavix for total of 21 days   -Echo done but without bubble study still pending   -Consulted neurology CTA head and neck ordered  - Had Left occipital stroke in 2022 was non compliant with meds at home   -EEG mild encephalopathy, not suggestive for seizure  -Appreciate Neurology    Sinus tachycardia with LBBB  -Echo EF 50-55% with moderate

## 2024-01-16 NOTE — PROGRESS NOTES
Neurology Progress Note     NAME: Abner Willams   :  1965   MRN:  [id number]   DATE:  1/15/2024    Assessment:     Principal Problem:    DKA, type 2, not at goal (HCC)  Active Problems:    Nausea and vomiting    Poorly controlled diabetes mellitus (HCC)    Flu syndrome    LBBB (left bundle branch block)  Resolved Problems:    * No resolved hospital problems. *    Patient is a 58 year-old R-handed female with history of HTN, HLD, DM and L occipital CVA in 2022 who was admitted on 24 with AMS, uncontrolled HTN, influenza A infection with cough, and N/V. He was unable to keep po intake down for at least a day. He was also noted to have PNA on CT chest and presented in DKA. CTA head/neck with mild to moderate stenosis of the R vert at the origin, severe stenosis at basilar artery/L vert junction, multifocal mod stenosis of basilar and superior cerebellar arteries and mild to mod PCA stenosis. Stenosis worse compared to CTA . MRI brain (24) demonstrated acute infarcts to L thalamus and posterior limb of internal capsule with mild CIWM changes and small chronic infarcts in L corona radiata and occipital lobes. There are incidental thyroid nodules seen and patchy R apical airspace disease. TTE 24 with EF 50-55%, LA normal. TTE with bubble study only (24)-negative. Patient was placed on cefepime initially which has since been discontinued.  HgbA1c 12.7, LDL 43.8. Patient was not compliant with home aspirin and statin.     Acute infarct. Vessel to vessel thromboembolic versus relative hypoperfusion due to hypotension related to basilar artery stenosis. Fluctuating mental status.   Plan:   - Continue DAPT with aspirin 81 mg and Plavix 75 mg daily indefinitely  - Continue atorvastatin 40 mg for goal LDL <70  - A1c not a goal, DMT consulted  - Goal BP 
                                                                                                                                                   Neurology Progress Note     NAME: Abner Willams   :  1965   MRN:  [id number]   DATE:  2024    Assessment:     Principal Problem:    DKA, type 2, not at goal (HCC)  Active Problems:    Nausea and vomiting    Poorly controlled diabetes mellitus (HCC)    Flu syndrome    LBBB (left bundle branch block)  Resolved Problems:    * No resolved hospital problems. *    Pt is a 59yo RH male with HTN, DM, HLD, h/o left occipital CVA in 2022, moderate basilar artery stenosis in , non-complaint with all medications, admitted 24 with AMS, uncontrolled HTN, influenza A infection with cough, N/V, unable to keep POs down x 1 day, CT chest concerning for viral PNA, in DKA. /78, .  . LA 2.6, HgbA1C 12.7. O2 sats in ED 89% on 2L. MRI brain 24 with acute infarcts in left thalamus and posterior limb of IC, mild CIWM changes, small chronic infarcts in left CR and occipital lobe. Incidental right parotid gland lesion, stable from , c/w neoplasm. CD neg.   Exam - confusion most c/w delirium, right arm and leg weakness, right LQ VF deficit.   CTA H/N with mild to mod stenosis right VA origin, severe stenosis at BA/left VA junction, multifocal mod stenosis BA and superior cerebellar arteries, mild to mod PCA stenosis. Incidental thyroid nodules seen and patchy right apical airspace disease.  In comparing CTA report from 2022, worsening stenosis.   TTE 24 with EF 50-55%, LA normal size, 24 - bubble study only, neg.   LDL 43.8     Possible vessel to vessel thromboembolic strokes related to basilar artery stenosis vs relative hypoperfusion of an at risk vessel due to hypotension  Encephalopathy, metabolic, improving  Plan:   -Continue DAPT  -Decrease Lipitor to 40mg daily, well below goal<70  -Maximize medical management of 
                                                                                 [facility]  Cardiology Care Note                  []Initial visit     [x]Established visit     Patient Name: Abner Willams - :1965 - MRN:[id number]  Primary Cardiologist: None  Consulting Cardiologist: Jocelyn Bueno MD     Reason for initial visit: wide complex tachycardia    Assessment/Plan/Discussion:Cardiology Attending:     Patient seen on the day of progress note and examined  reviewed  with Advance Practice Provider (LUCY, NP,PA)       A/P: Normal LV systolic function with LVH suspect diastolic dysfunction but not in acute exacerbation.  His symptoms mainly result from the flu leading to nausea vomiting and DKA.  No evidence of VT on available strips.  Continue to keep electrolytes normal.  Poor compliance with prior BP meds on amlodipine since he has LVH consider change to losartan we will make LV remodeling improved.  Continue aspirin and statin for CVA  Will sign off and see patient back as needed.  Will arrange follow-up in the office in 1 month with nurse practitioner cardiology  No future appointments.         S:Abner Willams is a 58 y.o. male   More alert today but still appears weak has increased secretions cough from his viral infection.  Denies chest pain or chest pressure.  Prior history of hypertension diabetes right occipital CVA present with cough uncontrolled blood pressure confusion positive for influenza A and was in DKA cardiology consulted because of a change in EKG from baseline this did not appear to be VT but rather his left bundle branch block with some periods of LVH with QRS widening.  We do not see ventricular tachycardia troponins are negative patient was awaiting echocardiogram which is now complete and shows an EF of 55% with moderate LVH.  O:BP (!) 158/78   Pulse 100   Temp 99 °F (37.2 °C) (Oral)   Resp 20   Ht 1.702 m (5' 7.01\")   Wt 63 kg (138 lb 14.2 oz)   SpO2 94%   
        [facility]                                                                                          Hospitalist Progress Note  Tre Cuevas MD  Office Phone: [phone]        Date of Service:  2024  NAME:  Abner Willams  :  1965  MRN:  [id number]       Admission Summary:   58 y.o man w/ type 2 DM, HTN, who presented with AMS, found to have DKA, recently diagnosed with influenza.       Interval history / Subjective:   Follow up AMS/DKA  Has expressive aphasia / confusion per RN slept all night still unable to answer any questions but follows commands  On 1 L   Still has cough      Assessment & Plan:     Type 2 DM w/ DKA  -transitioned insulin infusion to sc Lantus. Increased lantus today from 35 to 42 units  to 44 units also pre meal dose of Insulin increased from 3 to 7 units before meals no premeal Insulin if he is NPO   -SSI/POC checks  -advance diet if able  -Speech consulted  again discussed with RN to call speech to evaluate for aspiration as he still has cough and Ct showed pneumonia.     Sepsis  Acute hypoxic respiratory failure: Improved  Influenza A  Likely viral pneumonia  -CT chest consolidative opacities bilateral lung  -blood cultures no growth so far  -on Tamiflu  -He was on Cefepime due to concerns of Neurotoxixity was stopped now on Augmentin     Acute stroke : He has expressive aphasia due to stroke also slightly confused ?  -MRI brain suggestive of acute stroke has expressive aphasia   -on ASA/high dose statin   add Plavix for total of 21 days   -Echo done but without bubble study still pending   -Consulted neurology CTA head and neck ordered  - Had Left occipital stroke in  was non compliant with meds at home     Sinus tachycardia with LBBB  -Echo EF 50-55% with moderate concentric hypertrophy  -Appreciate Cardiology, planning for outpatient re-evaluation    History of CVA  Hyperlipidemia  -continue ASA, statin   - CTA H/N 
        [facility]                                                                                          Hospitalist Progress Note  Steven Carroll MD  Office Phone: [phone]        Date of Service:  2024  NAME:  Abner Willams  :  1965  MRN:  [id number]       Admission Summary:   58 y.o man w/ type 2 DM, HTN, who presented with AMS, found to have DKA, recently diagnosed with influenza.       Interval history / Subjective:   Follow up AMS/DKA  Confused, oriented times zero, per RN the patient's mental status waxes and wanes  On room air   Still has cough      Assessment & Plan:     Type 2 DM w/ DKA  -transitioned insulin infusion to sc Lantus. Increased lantus today from 35 to 42 units  to 44 units also pre meal dose of Insulin increased from 3 to 7 units before meals no premeal Insulin if he is NPO   -SSI/POC checks  -advance diet if able  -Speech consulted  again discussed with RN to call speech to evaluate for aspiration as he still has cough and Ct showed pneumonia.     Sepsis  Acute hypoxic respiratory failure: Improved  Influenza A  Likely viral pneumonia  -CT chest consolidative opacities bilateral lung  -blood cultures no growth so far  -on Tamiflu  -CXR 1/15 improved  -He was on Cefepime. Due to concerns of Neurotoxixity was stopped, now on Augmentin   -repeat CT chest in 3-6 months    Acute stroke : He has expressive aphasia due to stroke also slightly confused ?  -MRI brain suggestive of acute stroke has expressive aphasia   -on ASA/high dose statin   add Plavix for total of 21 days   -Echo done but without bubble study still pending   -Consulted neurology CTA head and neck ordered  - Had Left occipital stroke in  was non compliant with meds at home   -EEG mild encephalopathy, not suggestive for seizure  -Appreciate Neurology    Sinus tachycardia with LBBB  -Echo EF 50-55% with moderate concentric hypertrophy  -Appreciate Cardiology, 
        [facility]                                                                                          Hospitalist Progress Note  Tre Cuevas MD  Office Phone: [phone]        Date of Service:  2024  NAME:  Abner Willams  :  1965  MRN:  [id number]       Admission Summary:   58 y.o man w/ type 2 DM, HTN, who presented with AMS, found to have DKA, recently diagnosed with influenza.       Interval history / Subjective:   Follow up AMS/DKA  Has expressive aphasia  On room air now  Blood sugars remain elevated     Assessment & Plan:     Type 2 DM w/ DKA  -transitioned insulin infusion to sc Lantus. Increased lantus today from 35 to 42 units also pre meal dose of Insulin increased from 3 to 7 units before meals no premeal Insulin if he is NPO   -SSI/POC checks  -advance diet if able  -Speech consulted to evaluate for aspiration as he still has cough and Ct showed pneumonia.     Sepsis  Acute hypoxic respiratory failure  Influenza A  Likely viral pneumonia  -CT chest consolidative opacities bilateral lung  -blood cultures no growth so far  -on Tamiflu  -on Cefepime     AMS: He has expressive aphasia due to stroke   -MRI brain suggestive of acute stroke has expressive aphasia   -on ASA/high dose statin   add Plavix for total of 21 days   -Echo done but without bubble study  -Consulted neurology will order carotid doppler     Sinus tachycardia with LBBB  -Echo EF 50-55% with moderate concentric hypertrophy  -Appreciate Cardiology, planning for outpatient re-evaluation    History of CVA  Hyperlipidemia  -continue ASA, statin     Hypertension:  Added ACEI as he is diabetic     LBBB  . 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 Needs out patient follow up             Code status: full  Prophylaxis: Lovenox    Plan: Follow Neurology  Care Plan discussed 
        [facility]                                                                                          Hospitalist Progress Note  Steven Carroll MD  Office Phone: [phone]        Date of Service:  2024  NAME:  Abner Willams  :  1965  MRN:  [id number]       Admission Summary:   58 y.o man w/ type 2 DM, HTN, who presented with AMS, found to have DKA, recently diagnosed with influenza.       Interval history / Subjective:   Follow up AMS/DKA  RRT called 1/10 for tachycardia  Continues to be confused (better than before)  On 1l NC and saturating close to 95%  Blood sugars remain elevated  Wife at bedside     Assessment & Plan:     Type 2 DM w/ DKA  -transitioned insulin infusion to sc Lantus  -SSI/POC checks  -advance diet if able  -Will stop IV fluids now    Sepsis  Acute hypoxic respiratory failure  Influenza A  Likely viral pneumonia  -CT chest consolidative opacities bilateral lung  -blood cultures no growth so far  -on Tamiflu  -on Cefepime, not sure if he needs it    AMS: likely acute metabolic encephalopathy with the above but with a history of Stroke will get MRI brain done     Sinus tachycardia with LBBB  -Echo done, results awaited  -Appreciate Cardiology, planning for outpatient re-evaluation    History of CVA  Hyperlipidemia  -continue ASA, statin           Code status: full  Prophylaxis: Lovenox    Plan: MRI brain, likely discharge tomorrow  Care Plan discussed with: patient, wife, RN, CM  Anticipated Disposition: home ?tomorrow  Inpatient  Cardiac monitoring: Telemetry  Central Line:              Social Determinants of Health     Tobacco Use: Not on file   Alcohol Use: Not on file   Financial Resource Strain: Not on file   Food Insecurity: No Food Insecurity (2024)    Hunger Vital Sign     Worried About Running Out of Food in the Last Year: Never true     Ran Out of Food in the Last Year: Never true   Transportation Needs: No Transportation 
  Physician Progress Note      PATIENT:               BRENTON THIBODEAUX  CSN #:                  [id number]  :                       1965  ADMIT DATE:       2024 9:39 AM  DISCH DATE:  RESPONDING  PROVIDER #:        Steven Carroll MD          QUERY TEXT:    Noted documentation of acute respiratory failure in  Progress note,   however pt is documented as having no resp distress. In order to support the   diagnosis of acute respiratory failure, please include additional clinical   indicators in your documentation that indicate an increased work of breathing.    Or please document if the diagnosis of acute respiratory failure has been   ruled out after further study.    The medical record reflects the following:  Risk Factors: Flu A, PNA    Clinical Indicators:   VS on adm:  8:58 98.2, 129, 18  17:17 , RR 26, O2 Sat 94% on 2L  23:32 , RR 25, O2 Sat 96% on 4L    ER- no resp distress, tachycardia, pulmonary effort normal. assessment: URI    H&P- no acute distress, chest clear     Cardiac note: lethargic, clear lungs, good air movement. No accessory   muscle use, Course crackles bilaterally. Tachycardic     PN: continues to be confused (better than before0, on 1L NC and   saturating close to 95%. assess: Acute hypoxic respiratory failure    Treatment: O2 2L-5L    Acute Respiratory Failure Clinical Indicators per [employer] MS-DRG Training Guide and   Quick Reference Guide:  pO2 < 60 mmHg or SpO2 (pulse oximetry) < 91% breathing room air  pCO2 > 50 and pH < 7.35  P/F ratio (pO2 / FIO2) < 300  pO2 decrease or pCO2 increase by 10 mmHg from baseline (if known)  Supplemental oxygen of 40% or more  Presence of respiratory distress, tachypnea, dyspnea, shortness of breath,   wheezing  Unable to speak in complete sentences  Use of accessory muscles to breathe  Extreme anxiety and feeling of impending doom  Tripod position  Confusion/altered mental status/obtunded    Thank you,  Rose Flowers 
  Physician Progress Note      PATIENT:               BRENTON THIBODEAUX  CSN #:                  [id number]  :                       1965  ADMIT DATE:       2024 9:39 AM  DISCH DATE:  RESPONDING  PROVIDER #:        Steven Carroll MD          QUERY TEXT:    Patient admitted with DKA and Influenza A, noted to have tachycardia and spike   in temp up to 102.4. CTA showing Consolidative opacities throughout the   bilateral lungs with pt being stated on Maxipime IV. If possible, please   document in progress notes and discharge summary if you are evaluating and/or   treating any of the following:    The medical record reflects the following:  Risk Factors: Flu A    Clinical Indicators:   8:58 98.2, 129, 18  1/10 06:38 102.4, 104, 24  07:22 101.9, 92, 20     WBC 9.0, 80 neutrophils, Lactic acid 2.6, Influenza A+  1/10 WBC 3.4, 80 neutrophils   WBC 5.5     CXR neg  1/10 CTA chest- Consolidative opacities throughout the bilateral lungs, worse   in the lower lobes may suggest pneumonia    ER- cough and congestion x 3 days, also N/V, poor po intake, some confusion.   No known fevers.    H&P- Influenza +    1/10 PN: RRT called for patient because of tachycardia.  Patient was initially   admitted for evaluation and treatment of DKA and change in mental status.   Will obtain CTA of the chest and CT of the abdomen and pelvis.    Treatment: Maxipime IV initiated 1/10, Droplet isolation, Tamiflu    Thank you,  Rose Flowers RN  Clinical Documentation  [phone], or via Perfect Serve  Options provided:  -- Pneumonia POA  -- Pneumonia not POA  -- No Pneumonia  -- Other - I will add my own diagnosis  -- Disagree - Not applicable / Not valid  -- Disagree - Clinically unable to determine / Unknown  -- Refer to Clinical Documentation Reviewer    PROVIDER RESPONSE TEXT:    This patient has pneumonia POA.    Query created by: Rose Flowers on 2024 11:33 AM      Electronically signed by:  Steven Carroll MD 
  Physician Progress Note      PATIENT:               BRENTON THIBODEAUX  CSN #:                  [id number]  :                       1965  ADMIT DATE:       2024 9:39 AM  DISCH DATE:  RESPONDING  PROVIDER #:        Nely Goldberg MD          QUERY TEXT:    Pt admitted with DKA. Creat 1.4 on admission and has improved to 0.76 after   IVF resuscitation. If possible, please document in the progress notes and   discharge summary if you are evaluating and/or treating any of the following:    The medical record reflects the following:  Risk Factors: DKA, Influenza with N/V    Clinical Indicators:   Creat1.4  1/10 Creat 0.76    ER- ER-N/V, unable to keep po down. He is lethargic and disoriented. assess:   URI, AMS, DKA, Influenza A  H&P- DKA, DM2, Met encephalopathy, Influenza A    Treatment: 1L NS IV Bolus, followed by IVF at 100 cc/hr, I/O, monitoring of   labs    Defined by Kidney Disease Improving Global Outcomes (KDIGO) clinical practice   guideline for acute kidney injury:  -Increase in SCr by greater than or equal to 0.3 mg/dl within 48 hours; or  -Increase or decrease in SCr to greater than or equal to 1.5 times baseline,   which is known or presumed to have occurred within the prior 7 days; or  -Urine volume < 0.5ml/kg/h for 6 hours    Thank you,  Rose Flowers RN  Clinical Documentation  [phone], or via Perfect Serve  Options provided:  -- Acute kidney injury  -- No acute kidney injury  -- Other - I will add my own diagnosis  -- Disagree - Not applicable / Not valid  -- Disagree - Clinically unable to determine / Unknown  -- Refer to Clinical Documentation Reviewer    PROVIDER RESPONSE TEXT:    This patient does not have an acute kidney injury.    Query created by: Rose Flowers on 1/10/2024 12:04 PM      Electronically signed by:  Nely Goldberg MD 1/10/2024 3:10 PM          
  Speech LAnguage Pathology EVALUATION    Patient: Abner Willams (58 y.o. male)  Date: 1/14/2024  Primary Diagnosis: Tachycardia [R00.0]  Influenza A [J10.1]  Encephalopathy [G93.40]  DKA, type 2, not at goal ([facility]) [E11.10]  Altered mental status, unspecified altered mental status type [R41.82]  Diabetic ketoacidosis without coma associated with diabetes mellitus due to underlying condition ([facility]) [E08.10]  Upper respiratory tract infection, unspecified type [J06.9]  Nausea and vomiting, unspecified vomiting type [R11.2]       Precautions:                     ASSESSMENT :  SLP evaluation complete. Patient with no overt difficulty swallowing nor overt s/s of aspiration. Recommend regular diet/thin liquids. No further swallowing needs anticipated.    Pt with a fluent expressive aphasia on this date. Pt appears to mask his deficits by not wanting to speak very much, but, when probed, notable anomia, verbal paraphasias, and some perseveration. Pt able to participate in simple and moderate auditory comprehension tasks without significant difficulty. Will benefit from continued diagnostic language treatment.    Patient will benefit from skilled intervention to address the above impairments.     PLAN :  Recommendations and Planned Interventions:  Diet: Regular and thin liquids  General aspiration precautions  Good oral care  Recommend utilizing the following strategies to facilitate patient's functional expressive language  -- Utilize simple Yes/No questions to obtain information  -- Allow patient extra time to process and respond       Acute SLP Services: Yes, patient will be followed by speech-language pathology 3x/week to address goals. Patient's rehabilitation potential is considered to be Good.    Discharge Recommendations: Yes, recommend SLP treatment at next level of care     SUBJECTIVE:   Patient stated, “That makes sense.”    OBJECTIVE:     Past Medical History:   Diagnosis Date    Diabetes ([facility])     Hypertension  
Bedside RN performed patient education and medication education. Discharge concerns initiated and discussed with patient, including clarification on \"who\" assists the patient at their home and instructions for when the home going patient should call their provider after discharge. Opportunity for questions and clarification was provided.      Patient receptive to education:Yes  Patient stated: ok   Barriers to Education: none  Diagnosis Education given:  Yes    Length of stay: 7  Expected Day of Discharge: 1/16/24  Ask if they have \"Help at Home\" & add to white board?  Yes    Education Day #: 1/16/24     Medication Education Given:  Yes  M in the box Medication name: none    Pt aware of HCAHPS survey: Yes    Stroke Education documented in Patient Education: Yes  Core Measures Documented in [facility]:  Risk Factors: Yes  Warning signs of stroke: Yes  When to Activate 911: Yes  Medication Education for Risk Factors: Yes  Smoking cessation if applicable: Yes  Written Education Given:  Yes    Discharge NIH Completed: Yes  Score: 3    BRAINS: Yes    Follow Up Appointment Made: Yes  Date/Time if applicable: TBD  
Comprehensive Nutrition Assessment    Type and Reason for Visit: Initial, Positive Nutrition Screen    Nutrition Recommendations/Plan:   Continue with 5 Carb Choice diet  Continue with Glucerna TID       Malnutrition Assessment:  Malnutrition Status:  No malnutrition (01/12/24 1026)         Nutrition Assessment:    57 yo male admitted for tachycardia, DKA, AMS, N/V x several days.  Pmhx: DM, HTN. + Flu.    MST received for weight loss and poor PO intakes.  Pt still with confusion.  Wife states pt is eating some of all his meals but not a lot.  Intakes documented as 25-75% meals.  Glucerna ordered which he is drinking.  Wife also notes pt has been non-compliant with DM meds at home PTA.  DM nurse specialist following.  Last weight in EMR hx is from 2 years ago, weight is down 12 lbs from then (8% loss).      Labs: Na+ 148, K+3.4, phos 1.2 (receiving Kphos), , HgbA1c 12.7      Nutritionally Significant Medications:  Lantus, Humalog, Kphos      Estimated Daily Nutrient Needs:  Energy Requirements Based On: Kcal/kg  Weight Used for Energy Requirements: Current  Energy (kcal/day): 7371-5267 (25-30 kcals/kg)  Weight Used for Protein Requirements: Current  Protein (g/day): 82 (1.3 gm/kg)  Method Used for Fluid Requirements: 1 ml/kcal  Fluid (ml/day):      Nutrition Related Findings:   Edema: None                    Last BM: 01/08/24    Wounds:   Wound Type: None      Current Nutrition Therapies:  Diet: 5 Carb  Supplements: Glucerna TID  Meal Intake:   Patient Vitals for the past 168 hrs:   PO Meals Eaten (%)   01/11/24 1913 51 - 75%   01/11/24 1500 26 - 50%   01/11/24 1158 1 - 25%   01/10/24 1729 26 - 50%   01/10/24 1157 26 - 50%     Supplement Intake:  Patient Vitals for the past 168 hrs:   PO Supplement (%)   01/11/24 1913 76 - 100%     Nutrition Support: none      Anthropometric Measures:  Height: 170.2 cm (5' 7\")  Ideal Body Weight (IBW): 148 lbs (67 kg)       Current Body Weight: 63.3 kg (139 lb 8.8 oz), 94.3 % 
Orders received, chart reviewed and patient evaluated by occupational therapy. Pending progression with skilled acute occupational therapy, recommend:  Continue to assess pending progress - at this time recommend IPR as patient below IND baseline    Recommend with nursing patient to complete as able in order to maintain strength, endurance and independence: OOB to chair 3x/day, ADLs with supervision/setup and mobilizing to the BSC for toileting with 1 assist. Thank you for your assistance.     Full evaluation to follow.     
PROCEDURE: ROUTINE INPATIENT EEG  NAME:   Abner Willams  ACCOUNT NUMBER : [id number]  MRN:   [id number]  DATE OF SERVICE: 1/15/2024     HISTORY/INDICATION: Patient is a 58-year-old right-handed male admitted with altered mental status on a background of influenza and pneumonia.  Patient continues to have waxing and waning mental status changes.  EEG is performed to evaluate for evidence of underlying seizure activity.    MEDICATIONS:   Current Facility-Administered Medications   Medication Dose Route Frequency Provider Last Rate Last Admin    atorvastatin (LIPITOR) tablet 40 mg  40 mg Oral Daily Elizabeth Skinner MD   40 mg at 01/15/24 0923    insulin glargine (LANTUS) injection vial 44 Units  44 Units SubCUTAneous Daily Tre Cuevas MD   44 Units at 01/15/24 0921    insulin lispro (HUMALOG) injection vial 7 Units  7 Units SubCUTAneous TID  Tre Cuevas MD   7 Units at 01/15/24 1151    clopidogrel (PLAVIX) tablet 75 mg  75 mg Oral Daily Tre Cuevas MD   75 mg at 01/15/24 0923    lisinopril (PRINIVIL;ZESTRIL) tablet 5 mg  5 mg Oral Daily Tre Cuevas MD   5 mg at 01/15/24 0923    insulin lispro (HUMALOG) injection vial 0-8 Units  0-8 Units SubCUTAneous TID  Tre Cuevas MD   2 Units at 01/14/24 1649    insulin lispro (HUMALOG) injection vial 0-4 Units  0-4 Units SubCUTAneous Nightly Tre Cuevas MD        amoxicillin-clavulanate (AUGMENTIN) 875-125 MG per tablet 1 tablet  1 tablet Oral 2 times per day Tre Cuevas MD   1 tablet at 01/15/24 0927    guaiFENesin (MUCINEX) extended release tablet 600 mg  600 mg Oral BID Steven Carroll MD   600 mg at 01/15/24 0924    potassium phosphate (monobasic) (K-PHOS) tablet 500 mg  500 mg Oral BID Steven Carroll MD   500 mg at 01/15/24 0922    acetaminophen (TYLENOL) tablet 650 mg  650 mg Oral Q4H PRN Angella Dong APRN - NP   650 mg at 01/15/24 1422    acetaminophen (TYLENOL) suppository 650 mg  650 mg Rectal Q4H PRN Angella Dong APRN - NP   650 mg at 
RRT called for patient because of tachycardia.  Patient was initially admitted for evaluation and treatment of DKA and change in mental status.  Patient was seen at bedside and EKG was obtained and reviewed.  This shows a wide-complex tachycardia, patient blood pressure remained stable.  Patient started on amiodarone 150 bolus followed by drip.  Will check CBC, CMP, mag and phosphorus, TSH, troponin levels, urine drug screen.  Will obtain CTA of the chest and CT of the abdomen and pelvis.  Will obtain echocardiogram and consult cardiology.  
studies, imaging, and have provided independent interpretation of the same.     Labs:     Recent Labs     01/09/24  0934 01/10/24  0247   WBC 9.0 3.4*   HGB 18.7* 15.0   HCT 55.9* 43.4    142*     Recent Labs     01/09/24  2112 01/09/24  2224 01/10/24  0247    146* 145   K HEMOLYZED,RECOLLECT REQUESTED 3.2*  3.2* 3.2*   * 122* 122*   CO2 17* 18* 21   BUN 13 12 8   MG HEMOLYZED,RECOLLECT REQUESTED 2.3 2.0   PHOS 1.0* 2.0* 1.0*     Recent Labs     01/09/24  0934   ALT 30   GLOB 5.3*     No results for input(s): \"INR\", \"APTT\" in the last 72 hours.    Invalid input(s): \"PTP\"   No results for input(s): \"TIBC\", \"FERR\" in the last 72 hours.    Invalid input(s): \"FE\", \"PSAT\"   No results found for: \"FOL\", \"RBCF\"   No results for input(s): \"PH\", \"PCO2\", \"PO2\" in the last 72 hours.  No results for input(s): \"CPK\" in the last 72 hours.    Invalid input(s): \"CPKMB\", \"CKNDX\", \"TROIQ\"  Lab Results   Component Value Date/Time    CHOL 213 02/09/2022 10:34 AM    HDL 49 02/09/2022 10:34 AM     No results found for: \"GLUCPOC\"  [unfilled]    Notes reviewed from all clinical/nonclinical/nursing services involved in patient's clinical care. Care coordination discussions were held with appropriate clinical/nonclinical/ nursing providers based on care coordination needs.         Patients current active Medications were reviewed, considered, added and adjusted based on the clinical condition today.      Home Medications were reconciled to the best of my ability given all available resources at the time of admission. Route is PO if not otherwise noted.      Admission Status:[id number]:::1}      Medications Reviewed:     Current Facility-Administered Medications   Medication Dose Route Frequency    iopamidol (ISOVUE-370) 76 % injection 100 mL  100 mL IntraVENous ONCE PRN    acetaminophen (TYLENOL) tablet 650 mg  650 mg Oral Q4H PRN    acetaminophen (TYLENOL) suppository 650 mg  650 mg Rectal Q4H PRN    insulin glargine (LANTUS) 
hours.    Invalid input(s): \"FE\", \"PSAT\"   No results found for: \"FOL\", \"RBCF\"   No results for input(s): \"PH\", \"PCO2\", \"PO2\" in the last 72 hours.  No results for input(s): \"CPK\" in the last 72 hours.    Invalid input(s): \"CPKMB\", \"CKNDX\", \"TROIQ\"  Lab Results   Component Value Date/Time    CHOL 96 01/13/2024 08:42 AM    HDL 34 01/13/2024 08:42 AM     No results found for: \"GLUCPOC\"  [unfilled]    Notes reviewed from all clinical/nonclinical/nursing services involved in patient's clinical care. Care coordination discussions were held with appropriate clinical/nonclinical/ nursing providers based on care coordination needs.         Patients current active Medications were reviewed, considered, added and adjusted based on the clinical condition today.      Home Medications were reconciled to the best of my ability given all available resources at the time of admission. Route is PO if not otherwise noted.      Admission Status:[id number]:::1}      Medications Reviewed:     Current Facility-Administered Medications   Medication Dose Route Frequency    atorvastatin (LIPITOR) tablet 40 mg  40 mg Oral Daily    insulin glargine (LANTUS) injection vial 44 Units  44 Units SubCUTAneous Daily    insulin lispro (HUMALOG) injection vial 7 Units  7 Units SubCUTAneous TID     clopidogrel (PLAVIX) tablet 75 mg  75 mg Oral Daily    lisinopril (PRINIVIL;ZESTRIL) tablet 5 mg  5 mg Oral Daily    insulin lispro (HUMALOG) injection vial 0-8 Units  0-8 Units SubCUTAneous TID     insulin lispro (HUMALOG) injection vial 0-4 Units  0-4 Units SubCUTAneous Nightly    amoxicillin-clavulanate (AUGMENTIN) 875-125 MG per tablet 1 tablet  1 tablet Oral 2 times per day    guaiFENesin (MUCINEX) extended release tablet 600 mg  600 mg Oral BID    potassium phosphate (monobasic) (K-PHOS) tablet 500 mg  500 mg Oral BID    acetaminophen (TYLENOL) tablet 650 mg  650 mg Oral Q4H PRN    acetaminophen (TYLENOL) suppository 650 mg  650 mg Rectal Q4H PRN    
injection vial 35 Units  35 Units SubCUTAneous Daily    insulin lispro (HUMALOG) injection vial 0-16 Units  0-16 Units SubCUTAneous TID     insulin lispro (HUMALOG) injection vial 0-4 Units  0-4 Units SubCUTAneous Nightly    insulin lispro (HUMALOG) injection vial 3 Units  3 Units SubCUTAneous TID     guaiFENesin (MUCINEX) extended release tablet 600 mg  600 mg Oral BID    potassium phosphate (monobasic) (K-PHOS) tablet 500 mg  500 mg Oral BID    acetaminophen (TYLENOL) tablet 650 mg  650 mg Oral Q4H PRN    acetaminophen (TYLENOL) suppository 650 mg  650 mg Rectal Q4H PRN    glucose chewable tablet 16 g  4 tablet Oral PRN    dextrose bolus 10% 125 mL  125 mL IntraVENous PRN    Or    dextrose bolus 10% 250 mL  250 mL IntraVENous PRN    glucagon injection 1 mg  1 mg SubCUTAneous PRN    dextrose 10 % infusion   IntraVENous Continuous PRN    oseltamivir (TAMIFLU) capsule 75 mg  75 mg Oral BID    ceFEPIme (MAXIPIME) 2,000 mg in sodium chloride 0.9 % 100 mL IVPB (mini-bag)  2,000 mg IntraVENous Q8H    dextrose bolus 10% 125 mL  125 mL IntraVENous PRN    Or    dextrose bolus 10% 250 mL  250 mL IntraVENous PRN    magnesium sulfate 2000 mg in 50 mL IVPB premix  2,000 mg IntraVENous PRN    sodium phosphate 15 mmol in sodium chloride 0.9 % 250 mL IVPB  15 mmol IntraVENous PRN    dextrose 5 % and 0.45 % sodium chloride infusion   IntraVENous Continuous PRN    dextrose bolus 10% 125 mL  125 mL IntraVENous PRN    Or    dextrose bolus 10% 250 mL  250 mL IntraVENous PRN    polyethylene glycol (GLYCOLAX) packet 17 g  17 g Oral Daily PRN    enoxaparin (LOVENOX) injection 40 mg  40 mg SubCUTAneous Daily    potassium chloride 10 mEq/100 mL IVPB (Peripheral Line)  10 mEq IntraVENous PRN    dextrose 5 % and 0.45 % sodium chloride infusion   IntraVENous Continuous PRN    amLODIPine (NORVASC) tablet 5 mg  5 mg Oral Daily    hydrALAZINE (APRESOLINE) injection 5 mg  5 mg IntraVENous Q6H PRN    aspirin chewable tablet 81 mg  81 mg Oral 
corona radiata and left occipital lobe.  3. 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    CT Result (most recent):  CTA HEAD NECK W CONTRAST 01/14/2024    Narrative  CLINICAL HISTORY: Acute left thalamic stroke    EXAMINATION:  CT ANGIOGRAPHY HEAD AND NECK    COMPARISON: MR brain 1/12/2024    TECHNIQUE:  Following the uneventful administration of iodinated contrast  material, axial CT angiography of the head and neck was performed. Delayed axial  images through the head were also obtained. Coronal and sagittal reconstructions  were obtained. Manual postprocessing of images was performed. 3-D  Sagittal  maximal intensity projection images were obtained.  3-D Coronal maximal  intensity projections were obtained.  CT dose reduction was achieved through use  of a standardized protocol tailored for this examination and automatic exposure  control for dose modulation.    FINDINGS:    Delayed contrast-enhanced head CT:    Acute anterior left thalamic infarction is again seen. No evidence for acute  intracranial hemorrhage. No midline shift or mass effect. Mild bilateral  subcortical and periventricular areas of patchy low attenuation is nonspecific  but likely related to changes of chronic small vessel ischemic disease. Mucosal  thickening in the left maxillary sinus and left ethmoidal air cells.    CTA NECK:    Great vessels: Patent origins    Right subclavian artery: Patent    Left subclavian artery: Patent    Right common carotid artery: Patent    Left common carotid artery: Patent    Cervical right internal carotid artery: Patent with no significant stenosis by  NASCET criteria.    Cervical left internal carotid artery: Patent with no significant stenosis by  NASCET criteria.    Right vertebral artery: Mild to moderate focal narrowing at the origin otherwise  patent and unremarkable    Left vertebral artery:

## 2024-01-17 NOTE — TELEPHONE ENCOUNTER
Called the patient to schedule , but his phone number couldn't leave a message, mailbox not set up yet, So I called his wife and she schedule him with Blas on 03.05.24

## 2024-03-01 ENCOUNTER — OFFICE VISIT (OUTPATIENT)
Age: 59
End: 2024-03-01
Payer: COMMERCIAL

## 2024-03-01 ENCOUNTER — NURSE ONLY (OUTPATIENT)
Age: 59
End: 2024-03-01
Payer: COMMERCIAL

## 2024-03-01 ENCOUNTER — TELEPHONE (OUTPATIENT)
Age: 59
End: 2024-03-01

## 2024-03-01 VITALS
SYSTOLIC BLOOD PRESSURE: 110 MMHG | HEART RATE: 97 BPM | HEIGHT: 68 IN | DIASTOLIC BLOOD PRESSURE: 60 MMHG | BODY MASS INDEX: 23.79 KG/M2 | TEMPERATURE: 98 F | WEIGHT: 157 LBS | OXYGEN SATURATION: 99 % | RESPIRATION RATE: 16 BRPM

## 2024-03-01 DIAGNOSIS — Z79.4 TYPE 2 DIABETES MELLITUS WITHOUT COMPLICATION, WITH LONG-TERM CURRENT USE OF INSULIN (HCC): ICD-10-CM

## 2024-03-01 DIAGNOSIS — Z79.4 TYPE 2 DIABETES MELLITUS WITHOUT COMPLICATION, WITH LONG-TERM CURRENT USE OF INSULIN (HCC): Primary | ICD-10-CM

## 2024-03-01 DIAGNOSIS — E11.9 TYPE 2 DIABETES MELLITUS WITHOUT COMPLICATION, WITH LONG-TERM CURRENT USE OF INSULIN (HCC): ICD-10-CM

## 2024-03-01 DIAGNOSIS — E11.9 TYPE 2 DIABETES MELLITUS WITHOUT COMPLICATION, WITH LONG-TERM CURRENT USE OF INSULIN (HCC): Primary | ICD-10-CM

## 2024-03-01 DIAGNOSIS — R35.1 NOCTURIA: ICD-10-CM

## 2024-03-01 DIAGNOSIS — Z12.11 SCREENING FOR MALIGNANT NEOPLASM OF COLON: ICD-10-CM

## 2024-03-01 DIAGNOSIS — E78.5 HYPERLIPIDEMIA, UNSPECIFIED HYPERLIPIDEMIA TYPE: ICD-10-CM

## 2024-03-01 DIAGNOSIS — K11.8 PAROTID MASS: ICD-10-CM

## 2024-03-01 DIAGNOSIS — J12.9 VIRAL PNEUMONIA: ICD-10-CM

## 2024-03-01 DIAGNOSIS — I10 ESSENTIAL HYPERTENSION: ICD-10-CM

## 2024-03-01 DIAGNOSIS — I69.30 LATE EFFECT OF CEREBROVASCULAR ACCIDENT (CVA): ICD-10-CM

## 2024-03-01 PROCEDURE — 3078F DIAST BP <80 MM HG: CPT | Performed by: FAMILY MEDICINE

## 2024-03-01 PROCEDURE — PBSHW PNEUMOCOCCAL, PCV20, PREVNAR 20, (AGE 6W+), IM, PF: Performed by: FAMILY MEDICINE

## 2024-03-01 PROCEDURE — 3046F HEMOGLOBIN A1C LEVEL >9.0%: CPT | Performed by: FAMILY MEDICINE

## 2024-03-01 PROCEDURE — 3074F SYST BP LT 130 MM HG: CPT | Performed by: FAMILY MEDICINE

## 2024-03-01 PROCEDURE — 90677 PCV20 VACCINE IM: CPT | Performed by: FAMILY MEDICINE

## 2024-03-01 PROCEDURE — 99204 OFFICE O/P NEW MOD 45 MIN: CPT | Performed by: FAMILY MEDICINE

## 2024-03-01 RX ORDER — METFORMIN HYDROCHLORIDE 500 MG/1
1000 TABLET, FILM COATED, EXTENDED RELEASE ORAL DAILY
Qty: 180 TABLET | Refills: 1 | Status: SHIPPED | OUTPATIENT
Start: 2024-03-01

## 2024-03-01 RX ORDER — INSULIN GLARGINE-YFGN 100 [IU]/ML
INJECTION, SOLUTION SUBCUTANEOUS
COMMUNITY
Start: 2024-01-16

## 2024-03-01 RX ORDER — VALSARTAN 40 MG/1
40 TABLET ORAL DAILY
Qty: 90 TABLET | Refills: 1 | Status: SHIPPED | OUTPATIENT
Start: 2024-03-01

## 2024-03-01 RX ORDER — PEN NEEDLE, DIABETIC 30 GX5/16"
1 NEEDLE, DISPOSABLE MISCELLANEOUS DAILY
Qty: 100 EACH | Refills: 3 | Status: SHIPPED | OUTPATIENT
Start: 2024-03-01

## 2024-03-01 SDOH — ECONOMIC STABILITY: FOOD INSECURITY: WITHIN THE PAST 12 MONTHS, YOU WORRIED THAT YOUR FOOD WOULD RUN OUT BEFORE YOU GOT MONEY TO BUY MORE.: NEVER TRUE

## 2024-03-01 SDOH — ECONOMIC STABILITY: INCOME INSECURITY: HOW HARD IS IT FOR YOU TO PAY FOR THE VERY BASICS LIKE FOOD, HOUSING, MEDICAL CARE, AND HEATING?: NOT HARD AT ALL

## 2024-03-01 SDOH — ECONOMIC STABILITY: FOOD INSECURITY: WITHIN THE PAST 12 MONTHS, THE FOOD YOU BOUGHT JUST DIDN'T LAST AND YOU DIDN'T HAVE MONEY TO GET MORE.: NEVER TRUE

## 2024-03-01 SDOH — ECONOMIC STABILITY: HOUSING INSECURITY
IN THE LAST 12 MONTHS, WAS THERE A TIME WHEN YOU DID NOT HAVE A STEADY PLACE TO SLEEP OR SLEPT IN A SHELTER (INCLUDING NOW)?: NO

## 2024-03-01 ASSESSMENT — PATIENT HEALTH QUESTIONNAIRE - PHQ9
2. FEELING DOWN, DEPRESSED OR HOPELESS: 0
SUM OF ALL RESPONSES TO PHQ QUESTIONS 1-9: 0
SUM OF ALL RESPONSES TO PHQ9 QUESTIONS 1 & 2: 0
1. LITTLE INTEREST OR PLEASURE IN DOING THINGS: 0
SUM OF ALL RESPONSES TO PHQ QUESTIONS 1-9: 0

## 2024-03-01 NOTE — PROGRESS NOTES
Identified pt with two pt identifiers(name and ).    Chief Complaint   Patient presents with    New Patient    Establish Care    Discuss Medications     Only concern is he would like to discuss medications.     Orders     Colonoscopy     Follow-Up from Hospital     Pt was seen in [facility] for the flu, pneumonia and DKA         Health Maintenance Due   Topic    Hepatitis B vaccine (1 of 3 - 3-dose series)    COVID-19 Vaccine (1)    Pneumococcal 0-64 years Vaccine (1 - PCV)    DTaP/Tdap/Td vaccine (1 - Tdap)    Colorectal Cancer Screen     Shingles vaccine (1 of 2)    Diabetic Alb to Cr ratio (uACR) test     Depression Screen     Flu vaccine (1)       Wt Readings from Last 3 Encounters:   24 71.2 kg (157 lb)   24 70 kg (154 lb 5.2 oz)   22 68.5 kg (151 lb)     Temp Readings from Last 3 Encounters:   24 98 °F (36.7 °C) (Temporal)   24 98.8 °F (37.1 °C) (Oral)     BP Readings from Last 3 Encounters:   24 110/60   24 129/80   22 126/78     Pulse Readings from Last 3 Encounters:   24 97   24 96   22 90           Depression Screening:  :         3/1/2024     9:38 AM   PHQ-9 Questionaire   Little interest or pleasure in doing things 0   Feeling down, depressed, or hopeless 0   PHQ-9 Total Score 0        Fall Risk Assessment:  :          No data to display                 Abuse Screening:  :          No data to display                 Coordination of Care Questionnaire:  :     \"Have you been to the ER, urgent care clinic since your last visit?  Hospitalized since your last visit?\"    Yes [facility] for for flu and pneumonia and DKA     “Have you seen or consulted any other health care providers outside of [facility] since your last visit?”    NO    “Have you had a colorectal cancer screening such as a colonoscopy/FIT/Cologuard?    NO

## 2024-03-01 NOTE — PROGRESS NOTES
[facility]  [address] [place] [address]  Phone: [phone]  Fax: [phone]        Chief Complaint   Patient presents with    New Patient    Establish Care    Discuss Medications     Only concern is he would like to discuss medications.     Orders     Colonoscopy     Follow-Up from Hospital     Pt was seen in [facility] for the flu, pneumonia and DKA      He is a 58 y.o. male who presents for establish care.    Needs hospital follow up.  Admitted to [facility] from 1/9 - 1/16 for DKA. Hospital records personally reviewed and summarized as below. He was found to have flu A and viral PNA.  He was treated with insulin gtt.  Flu was treated with tamiflu.  His CXR improved.  Discharged on augmentin.  Was also found to have an acute stroke as there were positive MRI findings and expressive aphasia.  Hospitalist also noted a parotid gland lesion that needed out patient follow up.    Glucoses are usually running 160s-170s.  Evening glucoses are usually in the 220s. Reports compliance with medications.    Since hospitalization, wife has reported a significant cognitive decline.  He is unable to fully care for himself.  He is unable to have conversation and is often confused.    Has neurology follow up scheduled for 3/5.      Prior to Visit Medications    Medication Sig Taking? Authorizing Provider   SEMGLEE, YFGN, 100 UNIT/ML SOPN INJECT 44 UNITS SUBCUTANEOUSLY NIGHTLY Yes Provider, MD Jenna   valsartan (DIOVAN) 40 MG tablet Take 1 tablet by mouth daily Yes Cliff Villar MD   Insulin Pen Needle (PEN NEEDLES 3/16\") 31G X 5 MM MISC 1 each by Does not apply route daily Yes Cliff Villar MD   metFORMIN, MOD, (GLUMETZA) 500 MG extended release tablet Take 2 tablets by mouth daily Yes Cliff Villar MD   atorvastatin (LIPITOR) 80 MG tablet Take 0.5 tablets by mouth daily Yes Steven Carroll MD   insulin lispro, 1 Unit Dial, (HUMALOG KWIKPEN) 100 UNIT/ML SOPN Inject 9 Units into the skin 3 times

## 2024-03-01 NOTE — TELEPHONE ENCOUNTER
----- Message from Charisma Jo MA sent at 3/1/2024  1:41 PM EST -----  Regarding: FW: Lab Notification: Samples unable to be obtained    ----- Message -----  From: Emerita Higgins  Sent: 3/1/2024  12:18 PM EST  To: [facility] Client Services Staff Pool; #  Subject: Lab Notification: Samples unable to be obtai#    We have been notified that samples could not be obtained for the patient's below most recent lab work. Please place new orders prior to the patient's return.    Patient Name: Abner Willams    Patient : [id number]    Ordering Provider: Dr CAL Villar    No urine obtained for 3/1 order            If you have any questions please call [phone] and a representative will assist you.  Thank you,  [facility] Client Services

## 2024-03-02 LAB
ALBUMIN SERPL-MCNC: 3.6 G/DL (ref 3.5–5)
ALBUMIN/GLOB SERPL: 1 (ref 1.1–2.2)
ALP SERPL-CCNC: 81 U/L (ref 45–117)
ALT SERPL-CCNC: 30 U/L (ref 12–78)
ANION GAP SERPL CALC-SCNC: 2 MMOL/L (ref 5–15)
AST SERPL-CCNC: 28 U/L (ref 15–37)
BILIRUB SERPL-MCNC: 0.7 MG/DL (ref 0.2–1)
BUN SERPL-MCNC: 11 MG/DL (ref 6–20)
BUN/CREAT SERPL: 18 (ref 12–20)
CALCIUM SERPL-MCNC: 9.5 MG/DL (ref 8.5–10.1)
CHLORIDE SERPL-SCNC: 105 MMOL/L (ref 97–108)
CHOLEST SERPL-MCNC: 171 MG/DL
CO2 SERPL-SCNC: 30 MMOL/L (ref 21–32)
CREAT SERPL-MCNC: 0.62 MG/DL (ref 0.7–1.3)
EST. AVERAGE GLUCOSE BLD GHB EST-MCNC: 197 MG/DL
GLOBULIN SER CALC-MCNC: 3.7 G/DL (ref 2–4)
GLUCOSE SERPL-MCNC: 159 MG/DL (ref 65–100)
HBA1C MFR BLD: 8.5 % (ref 4–5.6)
HDLC SERPL-MCNC: 65 MG/DL
HDLC SERPL: 2.6 (ref 0–5)
LDLC SERPL CALC-MCNC: 90.6 MG/DL (ref 0–100)
POTASSIUM SERPL-SCNC: 4.3 MMOL/L (ref 3.5–5.1)
PROT SERPL-MCNC: 7.3 G/DL (ref 6.4–8.2)
PSA SERPL-MCNC: 1.5 NG/ML (ref 0.01–4)
SODIUM SERPL-SCNC: 137 MMOL/L (ref 136–145)
TRIGL SERPL-MCNC: 77 MG/DL
VLDLC SERPL CALC-MCNC: 15.4 MG/DL

## 2024-03-05 ENCOUNTER — TELEPHONE (OUTPATIENT)
Age: 59
End: 2024-03-05

## 2024-03-05 ENCOUNTER — OFFICE VISIT (OUTPATIENT)
Age: 59
End: 2024-03-05
Payer: COMMERCIAL

## 2024-03-05 VITALS
HEIGHT: 67 IN | SYSTOLIC BLOOD PRESSURE: 133 MMHG | OXYGEN SATURATION: 97 % | WEIGHT: 157 LBS | DIASTOLIC BLOOD PRESSURE: 75 MMHG | HEART RATE: 88 BPM | RESPIRATION RATE: 17 BRPM | BODY MASS INDEX: 24.64 KG/M2

## 2024-03-05 DIAGNOSIS — R47.89 WORD FINDING DIFFICULTY: ICD-10-CM

## 2024-03-05 DIAGNOSIS — Z79.4 TYPE 2 DIABETES MELLITUS WITHOUT COMPLICATION, WITH LONG-TERM CURRENT USE OF INSULIN (HCC): Primary | ICD-10-CM

## 2024-03-05 DIAGNOSIS — E11.9 TYPE 2 DIABETES MELLITUS WITHOUT COMPLICATION, WITH LONG-TERM CURRENT USE OF INSULIN (HCC): Primary | ICD-10-CM

## 2024-03-05 DIAGNOSIS — Z86.73 HISTORY OF MULTIPLE STROKES: Primary | ICD-10-CM

## 2024-03-05 DIAGNOSIS — Z79.02 LONG TERM (CURRENT) USE OF ANTITHROMBOTICS/ANTIPLATELETS: ICD-10-CM

## 2024-03-05 DIAGNOSIS — Z09 HOSPITAL DISCHARGE FOLLOW-UP: ICD-10-CM

## 2024-03-05 PROCEDURE — 99215 OFFICE O/P EST HI 40 MIN: CPT

## 2024-03-05 PROCEDURE — 1111F DSCHRG MED/CURRENT MED MERGE: CPT

## 2024-03-05 RX ORDER — CLOPIDOGREL BISULFATE 75 MG/1
75 TABLET ORAL DAILY
Qty: 90 TABLET | Refills: 1 | Status: SHIPPED | OUTPATIENT
Start: 2024-03-05

## 2024-03-05 ASSESSMENT — PATIENT HEALTH QUESTIONNAIRE - PHQ9
SUM OF ALL RESPONSES TO PHQ9 QUESTIONS 1 & 2: 0
SUM OF ALL RESPONSES TO PHQ QUESTIONS 1-9: 0
SUM OF ALL RESPONSES TO PHQ QUESTIONS 1-9: 0
1. LITTLE INTEREST OR PLEASURE IN DOING THINGS: 0
SUM OF ALL RESPONSES TO PHQ QUESTIONS 1-9: 0
2. FEELING DOWN, DEPRESSED OR HOPELESS: 0
SUM OF ALL RESPONSES TO PHQ QUESTIONS 1-9: 0

## 2024-03-05 ASSESSMENT — ENCOUNTER SYMPTOMS: PHOTOPHOBIA: 1

## 2024-03-05 NOTE — PROGRESS NOTES
Chief Complaint   Patient presents with    Follow-Up from Hospital    Cerebrovascular Accident      Vitals:    03/05/24 0901   BP: 133/75   Pulse: 88   Resp: 17   SpO2: 97%      
facility-administered medications for this visit.           Neurologic Exam     Mental Status   Oriented to person, place, and time.   Level of consciousness: alert  Some expressive aphasia     Cranial Nerves   Cranial nerves II through XII intact.     CN II   Right visual field deficit: upper nasal quadrant(s)  Left visual field deficit: upper nasal quadrant(s)    CN III, IV, VI   Pupils are equal, round, and reactive to light.    CN VII   Facial expression full, symmetric.   Baseline peripheral vision loss     Motor Exam   Muscle bulk: normal    Strength   Strength 5/5 throughout.     Sensory Exam   Light touch normal.     Gait, Coordination, and Reflexes     Gait  Gait: normal      /75 (Site: Right Upper Arm, Position: Sitting, Cuff Size: Small Adult)   Pulse 88   Resp 17   Ht 1.702 m (5' 7\")   Wt 71.2 kg (157 lb)   SpO2 97%   BMI 24.59 kg/m²       CT Results (maximum last 3):  CT Result (most recent):  CTA HEAD NECK W CONTRAST 01/14/2024    Narrative  CLINICAL HISTORY: Acute left thalamic stroke    EXAMINATION:  CT ANGIOGRAPHY HEAD AND NECK    COMPARISON: MR brain 1/12/2024    TECHNIQUE:  Following the uneventful administration of iodinated contrast  material, axial CT angiography of the head and neck was performed. Delayed axial  images through the head were also obtained. Coronal and sagittal reconstructions  were obtained. Manual postprocessing of images was performed. 3-D  Sagittal  maximal intensity projection images were obtained.  3-D Coronal maximal  intensity projections were obtained.  CT dose reduction was achieved through use  of a standardized protocol tailored for this examination and automatic exposure  control for dose modulation.    FINDINGS:    Delayed contrast-enhanced head CT:    Acute anterior left thalamic infarction is again seen. No evidence for acute  intracranial hemorrhage. No midline shift or mass effect. Mild bilateral  subcortical and periventricular areas of patchy low

## 2024-03-05 NOTE — TELEPHONE ENCOUNTER
Patients insurance  will not cover generic glumetza formulation. Can rx be resent/changed to standard metformin  (not modified formulation)?

## 2024-03-06 RX ORDER — METFORMIN HYDROCHLORIDE 500 MG/1
1000 TABLET, EXTENDED RELEASE ORAL
Qty: 180 TABLET | Refills: 1 | Status: SHIPPED | OUTPATIENT
Start: 2024-03-06

## 2024-06-01 ENCOUNTER — HOSPITAL ENCOUNTER (OUTPATIENT)
Facility: HOSPITAL | Age: 59
End: 2024-06-01
Attending: FAMILY MEDICINE
Payer: COMMERCIAL

## 2024-06-01 DIAGNOSIS — J12.9 VIRAL PNEUMONIA: ICD-10-CM

## 2024-06-01 PROCEDURE — 71250 CT THORAX DX C-: CPT

## 2024-06-07 ENCOUNTER — OFFICE VISIT (OUTPATIENT)
Age: 59
End: 2024-06-07
Payer: COMMERCIAL

## 2024-06-07 VITALS
SYSTOLIC BLOOD PRESSURE: 110 MMHG | BODY MASS INDEX: 23.89 KG/M2 | TEMPERATURE: 98.4 F | DIASTOLIC BLOOD PRESSURE: 62 MMHG | RESPIRATION RATE: 16 BRPM | WEIGHT: 152.2 LBS | HEART RATE: 92 BPM | OXYGEN SATURATION: 97 % | HEIGHT: 67 IN

## 2024-06-07 DIAGNOSIS — Z79.4 TYPE 2 DIABETES MELLITUS WITHOUT COMPLICATION, WITH LONG-TERM CURRENT USE OF INSULIN (HCC): ICD-10-CM

## 2024-06-07 DIAGNOSIS — E11.9 TYPE 2 DIABETES MELLITUS WITHOUT COMPLICATION, WITH LONG-TERM CURRENT USE OF INSULIN (HCC): Primary | ICD-10-CM

## 2024-06-07 DIAGNOSIS — Z79.02 LONG TERM (CURRENT) USE OF ANTITHROMBOTICS/ANTIPLATELETS: ICD-10-CM

## 2024-06-07 DIAGNOSIS — Z86.73 HISTORY OF MULTIPLE STROKES: ICD-10-CM

## 2024-06-07 DIAGNOSIS — Z79.4 TYPE 2 DIABETES MELLITUS WITHOUT COMPLICATION, WITH LONG-TERM CURRENT USE OF INSULIN (HCC): Primary | ICD-10-CM

## 2024-06-07 DIAGNOSIS — I10 ESSENTIAL HYPERTENSION: ICD-10-CM

## 2024-06-07 DIAGNOSIS — Z09 HOSPITAL DISCHARGE FOLLOW-UP: ICD-10-CM

## 2024-06-07 DIAGNOSIS — I69.30 LATE EFFECT OF CEREBROVASCULAR ACCIDENT (CVA): ICD-10-CM

## 2024-06-07 DIAGNOSIS — E11.9 TYPE 2 DIABETES MELLITUS WITHOUT COMPLICATION, WITH LONG-TERM CURRENT USE OF INSULIN (HCC): ICD-10-CM

## 2024-06-07 DIAGNOSIS — E08.10 DIABETIC KETOACIDOSIS WITHOUT COMA ASSOCIATED WITH DIABETES MELLITUS DUE TO UNDERLYING CONDITION (HCC): ICD-10-CM

## 2024-06-07 LAB
CREAT UR-MCNC: 130 MG/DL
HBA1C MFR BLD: 7.6 %
MICROALBUMIN UR-MCNC: 3.42 MG/DL
MICROALBUMIN/CREAT UR-RTO: 26 MG/G (ref 0–30)
SPECIMEN HOLD: NORMAL

## 2024-06-07 PROCEDURE — 99214 OFFICE O/P EST MOD 30 MIN: CPT | Performed by: FAMILY MEDICINE

## 2024-06-07 PROCEDURE — 83036 HEMOGLOBIN GLYCOSYLATED A1C: CPT | Performed by: FAMILY MEDICINE

## 2024-06-07 PROCEDURE — 3052F HG A1C>EQUAL 8.0%<EQUAL 9.0%: CPT | Performed by: FAMILY MEDICINE

## 2024-06-07 PROCEDURE — 3074F SYST BP LT 130 MM HG: CPT | Performed by: FAMILY MEDICINE

## 2024-06-07 PROCEDURE — 3078F DIAST BP <80 MM HG: CPT | Performed by: FAMILY MEDICINE

## 2024-06-07 PROCEDURE — PBSHW AMB POC HEMOGLOBIN A1C: Performed by: FAMILY MEDICINE

## 2024-06-07 RX ORDER — INSULIN GLARGINE-YFGN 100 [IU]/ML
INJECTION, SOLUTION SUBCUTANEOUS
Qty: 3 ML | Refills: 3 | Status: SHIPPED | OUTPATIENT
Start: 2024-06-07

## 2024-06-07 RX ORDER — VALSARTAN 40 MG/1
40 TABLET ORAL DAILY
Qty: 90 TABLET | Refills: 1 | Status: SHIPPED | OUTPATIENT
Start: 2024-06-07

## 2024-06-07 RX ORDER — INSULIN LISPRO 100 [IU]/ML
9 INJECTION, SOLUTION INTRAVENOUS; SUBCUTANEOUS
Qty: 15 ML | Refills: 1 | Status: SHIPPED | OUTPATIENT
Start: 2024-06-07

## 2024-06-07 RX ORDER — AMLODIPINE BESYLATE 5 MG/1
5 TABLET ORAL DAILY
Qty: 90 TABLET | Refills: 1 | Status: SHIPPED | OUTPATIENT
Start: 2024-06-07

## 2024-06-07 RX ORDER — INSULIN GLARGINE 100 [IU]/ML
INJECTION, SOLUTION SUBCUTANEOUS
COMMUNITY
Start: 2024-05-10 | End: 2024-06-07

## 2024-06-07 RX ORDER — CLOPIDOGREL BISULFATE 75 MG/1
75 TABLET ORAL DAILY
Qty: 90 TABLET | Refills: 1 | Status: SHIPPED | OUTPATIENT
Start: 2024-06-07

## 2024-06-07 ASSESSMENT — PATIENT HEALTH QUESTIONNAIRE - PHQ9
SUM OF ALL RESPONSES TO PHQ QUESTIONS 1-9: 0
SUM OF ALL RESPONSES TO PHQ QUESTIONS 1-9: 0
SUM OF ALL RESPONSES TO PHQ9 QUESTIONS 1 & 2: 0
SUM OF ALL RESPONSES TO PHQ QUESTIONS 1-9: 0
SUM OF ALL RESPONSES TO PHQ QUESTIONS 1-9: 0
1. LITTLE INTEREST OR PLEASURE IN DOING THINGS: NOT AT ALL
2. FEELING DOWN, DEPRESSED OR HOPELESS: NOT AT ALL

## 2024-06-07 NOTE — PROGRESS NOTES
Identified pt with two pt identifiers(name and ).    Chief Complaint   Patient presents with    Follow-up    Diabetes        Health Maintenance Due   Topic    Hepatitis B vaccine (1 of 3 - 3-dose series)    COVID-19 Vaccine (1)    DTaP/Tdap/Td vaccine (1 - Tdap)    Colorectal Cancer Screen     Shingles vaccine (1 of 2)    Diabetic Alb to Cr ratio (uACR) test        Wt Readings from Last 3 Encounters:   24 69 kg (152 lb 3.2 oz)   24 71.2 kg (157 lb)   24 71.2 kg (157 lb)     Temp Readings from Last 3 Encounters:   24 98.4 °F (36.9 °C) (Temporal)   24 98 °F (36.7 °C) (Temporal)   24 98.8 °F (37.1 °C) (Oral)     BP Readings from Last 3 Encounters:   24 110/62   24 133/75   24 110/60     Pulse Readings from Last 3 Encounters:   24 92   24 88   24 97           Depression Screening:  :         2024    10:14 AM 3/5/2024     9:01 AM 3/1/2024     9:38 AM   PHQ-9 Questionaire   Little interest or pleasure in doing things 0 0 0   Feeling down, depressed, or hopeless 0 0 0   PHQ-9 Total Score 0 0 0        Fall Risk Assessment:  :          No data to display                 Abuse Screening:  :          No data to display                 Coordination of Care Questionnaire:  :     \"Have you been to the ER, urgent care clinic since your last visit?  Hospitalized since your last visit?\"    NO    “Have you seen or consulted any other health care providers outside of [facility] since your last visit?”    NO    “Have you had a colorectal cancer screening such as a colonoscopy/FIT/Cologuard?    NO    No colonoscopy on file  No cologuard on file  No FIT/FOBT on file   No flexible sigmoidoscopy on file

## 2024-06-07 NOTE — PROGRESS NOTES
[facility]  [address] [place] [address]  Phone: [phone]  Fax: [phone]        Chief Complaint   Patient presents with    Follow-up    Diabetes     He is a 58 y.o. male who presents for DM follow up.    Presents for DM follow up. Taking all medications as directed with no side effects.  Following DM diet as best as possible.  Denies polyuria, polydipsia, polyphagia.  Blood sugar checks: not checking.    Presents for HTN follow up.  Taking medications as prescribed and reports no side effects.  Denies chest pain, headache, visual disturbances.  Does not check BP at home.    He has seen neurology since he saw me.  The neurology NP has recommended that he take plavix indefinitely.    Prior to Visit Medications    Medication Sig Taking? Authorizing Provider   SEMGLEE, YFGN, 100 UNIT/ML SOPN INJECT 44 UNITS SUBCUTANEOUSLY NIGHTLY Yes Cliff Villar MD   insulin lispro, 1 Unit Dial, (HUMALOG KWIKPEN) 100 UNIT/ML SOPN Inject 9 Units into the skin 3 times daily (before meals) Yes Cliff Villar MD   amLODIPine (NORVASC) 5 MG tablet Take 1 tablet by mouth daily Yes Cliff Villar MD   valsartan (DIOVAN) 40 MG tablet Take 1 tablet by mouth daily Yes Cliff Villar MD   clopidogrel (PLAVIX) 75 MG tablet Take 1 tablet by mouth daily Yes Cliff Villar MD   metFORMIN (GLUCOPHAGE-XR) 500 MG extended release tablet Take 2 tablets by mouth daily (with breakfast) Yes Cliff Villar MD   Insulin Pen Needle (PEN NEEDLES 3/16\") 31G X 5 MM MISC 1 each by Does not apply route daily Yes Cliff Villar MD   atorvastatin (LIPITOR) 80 MG tablet Take 0.5 tablets by mouth daily Yes Steven Carroll MD   aspirin 81 MG chewable tablet Take 1 tablet by mouth daily Yes Automatic Reconciliation, Ar       Allergies   Allergen Reactions    Macadamia Nut Oil Nausea And Vomiting     Patient is allergic to all tree nuts            Reviewed PmHx, RxHx, FmHx, SocHx, AllgHx and updated and dated in the

## 2024-09-13 ENCOUNTER — LAB (OUTPATIENT)
Age: 59
End: 2024-09-13
Payer: COMMERCIAL

## 2024-09-13 ENCOUNTER — OFFICE VISIT (OUTPATIENT)
Age: 59
End: 2024-09-13
Payer: COMMERCIAL

## 2024-09-13 VITALS
TEMPERATURE: 99.1 F | DIASTOLIC BLOOD PRESSURE: 78 MMHG | WEIGHT: 156 LBS | HEIGHT: 67 IN | RESPIRATION RATE: 18 BRPM | HEART RATE: 87 BPM | OXYGEN SATURATION: 97 % | BODY MASS INDEX: 24.48 KG/M2 | SYSTOLIC BLOOD PRESSURE: 112 MMHG

## 2024-09-13 DIAGNOSIS — Z79.4 TYPE 2 DIABETES MELLITUS WITHOUT COMPLICATION, WITH LONG-TERM CURRENT USE OF INSULIN (HCC): ICD-10-CM

## 2024-09-13 DIAGNOSIS — E11.9 TYPE 2 DIABETES MELLITUS WITHOUT COMPLICATION, WITH LONG-TERM CURRENT USE OF INSULIN (HCC): Primary | ICD-10-CM

## 2024-09-13 DIAGNOSIS — K59.00 CONSTIPATION, UNSPECIFIED CONSTIPATION TYPE: ICD-10-CM

## 2024-09-13 DIAGNOSIS — I10 ESSENTIAL HYPERTENSION: ICD-10-CM

## 2024-09-13 DIAGNOSIS — E11.9 TYPE 2 DIABETES MELLITUS WITHOUT COMPLICATION, WITH LONG-TERM CURRENT USE OF INSULIN (HCC): ICD-10-CM

## 2024-09-13 DIAGNOSIS — Z23 ENCOUNTER FOR IMMUNIZATION: ICD-10-CM

## 2024-09-13 DIAGNOSIS — Z79.4 TYPE 2 DIABETES MELLITUS WITHOUT COMPLICATION, WITH LONG-TERM CURRENT USE OF INSULIN (HCC): Primary | ICD-10-CM

## 2024-09-13 DIAGNOSIS — I69.30 LATE EFFECT OF CEREBROVASCULAR ACCIDENT (CVA): ICD-10-CM

## 2024-09-13 PROCEDURE — 99214 OFFICE O/P EST MOD 30 MIN: CPT | Performed by: FAMILY MEDICINE

## 2024-09-13 PROCEDURE — 3052F HG A1C>EQUAL 8.0%<EQUAL 9.0%: CPT | Performed by: FAMILY MEDICINE

## 2024-09-13 PROCEDURE — PBSHW TDAP, BOOSTRIX, (AGE 10 YRS+), IM: Performed by: FAMILY MEDICINE

## 2024-09-13 PROCEDURE — 90661 CCIIV3 VAC ABX FR 0.5 ML IM: CPT | Performed by: FAMILY MEDICINE

## 2024-09-13 PROCEDURE — 3078F DIAST BP <80 MM HG: CPT | Performed by: FAMILY MEDICINE

## 2024-09-13 PROCEDURE — 90471 IMMUNIZATION ADMIN: CPT | Performed by: FAMILY MEDICINE

## 2024-09-13 PROCEDURE — 3074F SYST BP LT 130 MM HG: CPT | Performed by: FAMILY MEDICINE

## 2024-09-13 PROCEDURE — PBSHW INFLUENZA, FLUCELVAX TRIVALENT, (AGE 6 MO+) IM, PRESERVATIVE FREE, 0.5ML: Performed by: FAMILY MEDICINE

## 2024-09-13 RX ORDER — INSULIN GLARGINE 100 [IU]/ML
14 INJECTION, SOLUTION SUBCUTANEOUS NIGHTLY
COMMUNITY
Start: 2024-08-16

## 2024-09-13 ASSESSMENT — PATIENT HEALTH QUESTIONNAIRE - PHQ9
SUM OF ALL RESPONSES TO PHQ9 QUESTIONS 1 & 2: 0
SUM OF ALL RESPONSES TO PHQ QUESTIONS 1-9: 0
2. FEELING DOWN, DEPRESSED OR HOPELESS: NOT AT ALL
SUM OF ALL RESPONSES TO PHQ QUESTIONS 1-9: 0
SUM OF ALL RESPONSES TO PHQ QUESTIONS 1-9: 0
1. LITTLE INTEREST OR PLEASURE IN DOING THINGS: NOT AT ALL
SUM OF ALL RESPONSES TO PHQ QUESTIONS 1-9: 0

## 2024-09-14 LAB
EST. AVERAGE GLUCOSE BLD GHB EST-MCNC: 137 MG/DL
HBA1C MFR BLD: 6.4 % (ref 4–5.6)

## 2024-10-15 ENCOUNTER — COMMUNITY OUTREACH (OUTPATIENT)
Age: 59
End: 2024-10-15

## 2024-12-14 DIAGNOSIS — E11.9 TYPE 2 DIABETES MELLITUS WITHOUT COMPLICATION, WITH LONG-TERM CURRENT USE OF INSULIN (HCC): ICD-10-CM

## 2024-12-14 DIAGNOSIS — Z79.4 TYPE 2 DIABETES MELLITUS WITHOUT COMPLICATION, WITH LONG-TERM CURRENT USE OF INSULIN (HCC): ICD-10-CM

## 2024-12-16 RX ORDER — INSULIN LISPRO 100 [IU]/ML
INJECTION, SOLUTION INTRAVENOUS; SUBCUTANEOUS
Qty: 15 ML | Refills: 0 | Status: SHIPPED | OUTPATIENT
Start: 2024-12-16

## 2025-01-30 DIAGNOSIS — E11.9 TYPE 2 DIABETES MELLITUS WITHOUT COMPLICATION, WITH LONG-TERM CURRENT USE OF INSULIN (HCC): ICD-10-CM

## 2025-01-30 DIAGNOSIS — Z79.4 TYPE 2 DIABETES MELLITUS WITHOUT COMPLICATION, WITH LONG-TERM CURRENT USE OF INSULIN (HCC): ICD-10-CM

## 2025-01-30 RX ORDER — INSULIN LISPRO 100 [IU]/ML
INJECTION, SOLUTION INTRAVENOUS; SUBCUTANEOUS
Qty: 15 ML | Refills: 2 | Status: SHIPPED | OUTPATIENT
Start: 2025-01-30 | End: 2025-01-31 | Stop reason: SDUPTHER

## 2025-01-31 ENCOUNTER — OFFICE VISIT (OUTPATIENT)
Age: 60
End: 2025-01-31
Payer: COMMERCIAL

## 2025-01-31 ENCOUNTER — LAB (OUTPATIENT)
Age: 60
End: 2025-01-31
Payer: COMMERCIAL

## 2025-01-31 VITALS
HEIGHT: 68 IN | WEIGHT: 165 LBS | HEART RATE: 88 BPM | DIASTOLIC BLOOD PRESSURE: 64 MMHG | RESPIRATION RATE: 16 BRPM | SYSTOLIC BLOOD PRESSURE: 112 MMHG | BODY MASS INDEX: 25.01 KG/M2 | TEMPERATURE: 98.1 F | OXYGEN SATURATION: 98 %

## 2025-01-31 DIAGNOSIS — I69.30 LATE EFFECT OF CEREBROVASCULAR ACCIDENT (CVA): ICD-10-CM

## 2025-01-31 DIAGNOSIS — E11.9 TYPE 2 DIABETES MELLITUS WITHOUT COMPLICATION, WITH LONG-TERM CURRENT USE OF INSULIN (HCC): ICD-10-CM

## 2025-01-31 DIAGNOSIS — Z12.11 SCREENING FOR MALIGNANT NEOPLASM OF COLON: ICD-10-CM

## 2025-01-31 DIAGNOSIS — E66.3 OVERWEIGHT (BMI 25.0-29.9): ICD-10-CM

## 2025-01-31 DIAGNOSIS — I10 ESSENTIAL HYPERTENSION: ICD-10-CM

## 2025-01-31 DIAGNOSIS — Z79.4 TYPE 2 DIABETES MELLITUS WITHOUT COMPLICATION, WITH LONG-TERM CURRENT USE OF INSULIN (HCC): Primary | ICD-10-CM

## 2025-01-31 DIAGNOSIS — Z79.4 TYPE 2 DIABETES MELLITUS WITHOUT COMPLICATION, WITH LONG-TERM CURRENT USE OF INSULIN (HCC): ICD-10-CM

## 2025-01-31 DIAGNOSIS — H54.7 POOR VISION: ICD-10-CM

## 2025-01-31 DIAGNOSIS — E11.9 TYPE 2 DIABETES MELLITUS WITHOUT COMPLICATION, WITH LONG-TERM CURRENT USE OF INSULIN (HCC): Primary | ICD-10-CM

## 2025-01-31 PROCEDURE — 3074F SYST BP LT 130 MM HG: CPT | Performed by: FAMILY MEDICINE

## 2025-01-31 PROCEDURE — 3078F DIAST BP <80 MM HG: CPT | Performed by: FAMILY MEDICINE

## 2025-01-31 PROCEDURE — 99214 OFFICE O/P EST MOD 30 MIN: CPT | Performed by: FAMILY MEDICINE

## 2025-01-31 RX ORDER — INSULIN GLARGINE 100 [IU]/ML
44 INJECTION, SOLUTION SUBCUTANEOUS NIGHTLY
Qty: 10 ADJUSTABLE DOSE PRE-FILLED PEN SYRINGE | Refills: 2 | Status: SHIPPED | OUTPATIENT
Start: 2025-01-31

## 2025-01-31 RX ORDER — INSULIN LISPRO 100 [IU]/ML
INJECTION, SOLUTION INTRAVENOUS; SUBCUTANEOUS
Qty: 15 ML | Refills: 2 | Status: SHIPPED | OUTPATIENT
Start: 2025-01-31

## 2025-01-31 SDOH — ECONOMIC STABILITY: FOOD INSECURITY: WITHIN THE PAST 12 MONTHS, YOU WORRIED THAT YOUR FOOD WOULD RUN OUT BEFORE YOU GOT MONEY TO BUY MORE.: NEVER TRUE

## 2025-01-31 SDOH — ECONOMIC STABILITY: FOOD INSECURITY: WITHIN THE PAST 12 MONTHS, THE FOOD YOU BOUGHT JUST DIDN'T LAST AND YOU DIDN'T HAVE MONEY TO GET MORE.: NEVER TRUE

## 2025-01-31 ASSESSMENT — PATIENT HEALTH QUESTIONNAIRE - PHQ9
2. FEELING DOWN, DEPRESSED OR HOPELESS: NOT AT ALL
SUM OF ALL RESPONSES TO PHQ9 QUESTIONS 1 & 2: 0
SUM OF ALL RESPONSES TO PHQ QUESTIONS 1-9: 0
1. LITTLE INTEREST OR PLEASURE IN DOING THINGS: NOT AT ALL
SUM OF ALL RESPONSES TO PHQ QUESTIONS 1-9: 0

## 2025-01-31 NOTE — PROGRESS NOTES
Identified pt with two pt identifiers(name and )    Chief Complaint   Patient presents with    Diabetes    Follow-up    Loss of Vision     He states this is getting worse.         Health Maintenance Due   Topic    Diabetic retinal exam     Hepatitis B vaccine (2 of 3 - Hep B Twinrix 3-dose series)    Colorectal Cancer Screen     Shingles vaccine (1 of 2)    COVID-19 Vaccine ( season)       Wt Readings from Last 3 Encounters:   25 74.8 kg (165 lb)   24 70.8 kg (156 lb)   24 69 kg (152 lb 3.2 oz)     Temp Readings from Last 3 Encounters:   25 98.1 °F (36.7 °C) (Temporal)   24 99.1 °F (37.3 °C)   24 98.4 °F (36.9 °C) (Temporal)     BP Readings from Last 3 Encounters:   25 112/64   24 112/78   24 110/62     Pulse Readings from Last 3 Encounters:   25 88   24 87   24 92           Depression Screening:  :         2025     9:37 AM 2024    10:47 AM 2024    10:14 AM 3/5/2024     9:01 AM 3/1/2024     9:38 AM   PHQ-9 Questionaire   Little interest or pleasure in doing things 0 0 0 0 0   Feeling down, depressed, or hopeless 0 0 0 0 0   PHQ-9 Total Score 0 0 0 0 0        Fall Risk Assessment:  :          No data to display                 Abuse Screening:  :          No data to display                 Coordination of Care Questionnaire:  :     \"Have you been to the ER, urgent care clinic since your last visit?  Hospitalized since your last visit?\"    NO    “Have you seen or consulted any other health care providers outside our system since your last visit?”    NO      “Have you had a colorectal cancer screening such as a colonoscopy/FIT/Cologuard?    NO sent in fit test.     No colonoscopy on file  No cologuard on file  No FIT/FOBT on file   No flexible sigmoidoscopy on file     “Have you had a diabetic eye exam?”    YES - Where: [facility]  Nurse/CMA to request most recent records if not in the chart     No diabetic eye exam on file

## 2025-01-31 NOTE — PROGRESS NOTES
[facility]  [facility] [place] [address]  Phone: [phone]  Fax: [phone]        Chief Complaint   Patient presents with    Diabetes    Follow-up    Loss of Vision     He states this is getting worse.      He is a 59 y.o. male who presents for DM follow up.    Presents for DM follow up. Taking all medications as directed with no side effects.  Following DM diet as best as possible.  Denies polyuria, polydipsia, polyphagia.  Blood sugar checks: usually 140s fasting.  Can be up to 200s.  There was some confusion about his lantus dosing. Chart reflects that he takes 14 units daily.  Wife assures me that he is taking 44 units daily. He does not draw it up.  He states that this seems really high.  I looked back through chart and I had him on 44 units of lantus daily, but this changed at the 9/2024 visit for some reason.    Presents for HTN follow up.  Taking medications as prescribed and reports no side effects.  Denies chest pain, headache, visual disturbances.  Does not check BP at home.    Biggest concern today is decreased vision. Says that everything is blurry.  He can barely see in front of him. This has worsened since his last visit.  Follows with [facility] for routine eye care. Has not reached out to them about this.  He has not seen ophtho in >1 year.    Prior to Visit Medications    Medication Sig Taking? Authorizing Provider   LANTUS SOLOSTAR 100 UNIT/ML injection pen Inject 44 Units into the skin nightly Yes Cliff Villar MD   insulin lispro, 1 Unit Dial, (HUMALOG/ADMELOG) 100 UNIT/ML SOPN INJECT 9 UNITS SUBCUTANEOUSLY THREE TIMES DAILY BEFORE  MEALS Yes Cliff Villar MD   amLODIPine (NORVASC) 5 MG tablet Take 1 tablet by mouth daily Yes Cliff Villar MD   valsartan (DIOVAN) 40 MG tablet Take 1 tablet by mouth daily Yes Cliff Villar MD   clopidogrel (PLAVIX) 75 MG tablet Take 1 tablet by mouth daily Yes Cliff Villar MD   metFORMIN (GLUCOPHAGE-XR) 500 MG extended release

## 2025-02-01 LAB
EST. AVERAGE GLUCOSE BLD GHB EST-MCNC: 143 MG/DL
HBA1C MFR BLD: 6.6 % (ref 4–5.6)

## 2025-02-28 DIAGNOSIS — I10 ESSENTIAL HYPERTENSION: ICD-10-CM

## 2025-02-28 RX ORDER — VALSARTAN 40 MG/1
40 TABLET ORAL DAILY
Qty: 90 TABLET | Refills: 1 | Status: SHIPPED | OUTPATIENT
Start: 2025-02-28

## 2025-04-04 DIAGNOSIS — I10 ESSENTIAL HYPERTENSION: ICD-10-CM

## 2025-04-04 DIAGNOSIS — I69.30 LATE EFFECT OF CEREBROVASCULAR ACCIDENT (CVA): ICD-10-CM

## 2025-04-04 DIAGNOSIS — Z79.02 LONG TERM (CURRENT) USE OF ANTITHROMBOTICS/ANTIPLATELETS: ICD-10-CM

## 2025-04-04 RX ORDER — CLOPIDOGREL BISULFATE 75 MG/1
75 TABLET ORAL DAILY
Qty: 30 TABLET | Refills: 0 | OUTPATIENT
Start: 2025-04-04

## 2025-04-04 RX ORDER — AMLODIPINE BESYLATE 5 MG/1
5 TABLET ORAL DAILY
Qty: 90 TABLET | Refills: 1 | Status: SHIPPED | OUTPATIENT
Start: 2025-04-04

## 2025-05-02 DIAGNOSIS — Z79.02 LONG TERM (CURRENT) USE OF ANTITHROMBOTICS/ANTIPLATELETS: ICD-10-CM

## 2025-05-02 DIAGNOSIS — I69.30 LATE EFFECT OF CEREBROVASCULAR ACCIDENT (CVA): ICD-10-CM

## 2025-05-05 RX ORDER — CLOPIDOGREL BISULFATE 75 MG/1
75 TABLET ORAL DAILY
Qty: 30 TABLET | Refills: 0 | OUTPATIENT
Start: 2025-05-05

## 2025-05-06 ENCOUNTER — TELEPHONE (OUTPATIENT)
Age: 60
End: 2025-05-06

## 2025-05-06 DIAGNOSIS — Z79.4 TYPE 2 DIABETES MELLITUS WITHOUT COMPLICATION, WITH LONG-TERM CURRENT USE OF INSULIN (HCC): Primary | ICD-10-CM

## 2025-05-06 DIAGNOSIS — E11.9 TYPE 2 DIABETES MELLITUS WITHOUT COMPLICATION, WITH LONG-TERM CURRENT USE OF INSULIN (HCC): Primary | ICD-10-CM

## 2025-05-06 RX ORDER — ATORVASTATIN CALCIUM 80 MG/1
40 TABLET, FILM COATED ORAL DAILY
Qty: 45 TABLET | Refills: 1 | Status: SHIPPED | OUTPATIENT
Start: 2025-05-06

## 2025-05-06 NOTE — TELEPHONE ENCOUNTER
Pharmacy Rx refill request:    atorvastatin (LIPITOR) 80 MG tablet [[id number]]      [id number] - [place], [place] - [address] [facility] - P [phone] - F [phone]  [address] [facility] [address]  Phone: [phone]  Fax: [phone]

## 2025-05-15 DIAGNOSIS — I69.30 LATE EFFECT OF CEREBROVASCULAR ACCIDENT (CVA): ICD-10-CM

## 2025-05-15 DIAGNOSIS — Z79.02 LONG TERM (CURRENT) USE OF ANTITHROMBOTICS/ANTIPLATELETS: ICD-10-CM

## 2025-05-16 RX ORDER — CLOPIDOGREL BISULFATE 75 MG/1
75 TABLET ORAL DAILY
Qty: 30 TABLET | Refills: 0 | OUTPATIENT
Start: 2025-05-16

## 2025-05-17 DIAGNOSIS — Z79.02 LONG TERM (CURRENT) USE OF ANTITHROMBOTICS/ANTIPLATELETS: ICD-10-CM

## 2025-05-17 DIAGNOSIS — I69.30 LATE EFFECT OF CEREBROVASCULAR ACCIDENT (CVA): ICD-10-CM

## 2025-05-19 RX ORDER — CLOPIDOGREL BISULFATE 75 MG/1
75 TABLET ORAL DAILY
Qty: 30 TABLET | Refills: 0 | OUTPATIENT
Start: 2025-05-19

## 2025-07-08 ENCOUNTER — LAB (OUTPATIENT)
Age: 60
End: 2025-07-08
Payer: COMMERCIAL

## 2025-07-08 ENCOUNTER — OFFICE VISIT (OUTPATIENT)
Age: 60
End: 2025-07-08
Payer: COMMERCIAL

## 2025-07-08 VITALS
DIASTOLIC BLOOD PRESSURE: 60 MMHG | RESPIRATION RATE: 16 BRPM | BODY MASS INDEX: 26.68 KG/M2 | HEIGHT: 67 IN | OXYGEN SATURATION: 96 % | TEMPERATURE: 99 F | SYSTOLIC BLOOD PRESSURE: 110 MMHG | HEART RATE: 87 BPM | WEIGHT: 170 LBS

## 2025-07-08 DIAGNOSIS — Z12.11 SCREENING FOR MALIGNANT NEOPLASM OF COLON: ICD-10-CM

## 2025-07-08 DIAGNOSIS — Z79.4 TYPE 2 DIABETES MELLITUS WITHOUT COMPLICATION, WITH LONG-TERM CURRENT USE OF INSULIN (HCC): Primary | ICD-10-CM

## 2025-07-08 DIAGNOSIS — I69.30 LATE EFFECT OF CEREBROVASCULAR ACCIDENT (CVA): ICD-10-CM

## 2025-07-08 DIAGNOSIS — Z79.02 LONG TERM (CURRENT) USE OF ANTITHROMBOTICS/ANTIPLATELETS: ICD-10-CM

## 2025-07-08 DIAGNOSIS — E11.9 TYPE 2 DIABETES MELLITUS WITHOUT COMPLICATION, WITH LONG-TERM CURRENT USE OF INSULIN (HCC): Primary | ICD-10-CM

## 2025-07-08 DIAGNOSIS — Z12.5 SCREENING FOR MALIGNANT NEOPLASM OF PROSTATE: ICD-10-CM

## 2025-07-08 DIAGNOSIS — E11.9 TYPE 2 DIABETES MELLITUS WITHOUT COMPLICATION, WITH LONG-TERM CURRENT USE OF INSULIN (HCC): ICD-10-CM

## 2025-07-08 DIAGNOSIS — H54.7 POOR VISION: ICD-10-CM

## 2025-07-08 DIAGNOSIS — I10 ESSENTIAL HYPERTENSION: ICD-10-CM

## 2025-07-08 DIAGNOSIS — Z79.4 TYPE 2 DIABETES MELLITUS WITHOUT COMPLICATION, WITH LONG-TERM CURRENT USE OF INSULIN (HCC): ICD-10-CM

## 2025-07-08 DIAGNOSIS — E78.5 HYPERLIPIDEMIA, UNSPECIFIED HYPERLIPIDEMIA TYPE: ICD-10-CM

## 2025-07-08 PROCEDURE — 3078F DIAST BP <80 MM HG: CPT | Performed by: FAMILY MEDICINE

## 2025-07-08 PROCEDURE — 99214 OFFICE O/P EST MOD 30 MIN: CPT | Performed by: FAMILY MEDICINE

## 2025-07-08 PROCEDURE — 3074F SYST BP LT 130 MM HG: CPT | Performed by: FAMILY MEDICINE

## 2025-07-08 PROCEDURE — 3044F HG A1C LEVEL LT 7.0%: CPT | Performed by: FAMILY MEDICINE

## 2025-07-08 RX ORDER — INSULIN LISPRO 100 [IU]/ML
14 INJECTION, SOLUTION INTRAVENOUS; SUBCUTANEOUS
Qty: 15 ML | Refills: 2 | Status: SHIPPED | OUTPATIENT
Start: 2025-07-08

## 2025-07-08 RX ORDER — CLOPIDOGREL BISULFATE 75 MG/1
75 TABLET ORAL DAILY
Qty: 90 TABLET | Refills: 1 | Status: SHIPPED | OUTPATIENT
Start: 2025-07-08

## 2025-07-08 ASSESSMENT — PATIENT HEALTH QUESTIONNAIRE - PHQ9
2. FEELING DOWN, DEPRESSED OR HOPELESS: NOT AT ALL
1. LITTLE INTEREST OR PLEASURE IN DOING THINGS: NOT AT ALL
SUM OF ALL RESPONSES TO PHQ QUESTIONS 1-9: 0
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
## 2025-07-08 NOTE — PROGRESS NOTES
Identified pt with two pt identifiers(name and )    Chief Complaint   Patient presents with    Follow-up     4 month     Diabetes        Health Maintenance Due   Topic    Diabetic retinal exam     Hepatitis B vaccine (2 of 3 - Hep B Twinrix 3-dose series)    Colorectal Cancer Screen     Shingles vaccine (1 of 2)    COVID-19 Vaccine ( season)    Lipids     GFR test (Diabetes, CKD 3-4, OR last GFR 15-59)     Diabetic foot exam     Diabetic Alb to Cr ratio (uACR) test        Wt Readings from Last 3 Encounters:   25 77.1 kg (170 lb)   25 74.8 kg (165 lb)   24 70.8 kg (156 lb)     Temp Readings from Last 3 Encounters:   25 99 °F (37.2 °C) (Temporal)   25 98.1 °F (36.7 °C) (Temporal)   24 99.1 °F (37.3 °C)     BP Readings from Last 3 Encounters:   25 110/60   25 112/64   24 112/78     Pulse Readings from Last 3 Encounters:   25 87   25 88   24 87           Depression Screening:  :         2025     2:30 PM 2025     9:37 AM 2024    10:47 AM 2024    10:14 AM 3/5/2024     9:01 AM 3/1/2024     9:38 AM   PHQ-9 Questionaire   Little interest or pleasure in doing things 0 0 0 0 0 0   Feeling down, depressed, or hopeless 0 0 0 0 0 0   PHQ-9 Total Score 0 0 0 0 0 0        Fall Risk Assessment:  :          No data to display                 Abuse Screening:  :          No data to display                 Coordination of Care Questionnaire:  :     \"Have you been to the ER, urgent care clinic since your last visit?  Hospitalized since your last visit?\"    NO    “Have you seen or consulted any other health care providers outside our system since your last visit?”    NO      “Have you had a colorectal cancer screening such as a colonoscopy/FIT/Cologuard?    YES - Type: Colonoscopy - Where: [facility]TD Fit test  Nurse/CMA to request most recent records if not in the chart     No colonoscopy on file  No cologuard on file  No FIT/FOBT on file
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
## 2025-07-09 LAB
ALBUMIN SERPL-MCNC: 3.7 G/DL (ref 3.5–5)
ALBUMIN/GLOB SERPL: 1.3 (ref 1.1–2.2)
ALP SERPL-CCNC: 73 U/L (ref 45–117)
ALT SERPL-CCNC: 81 U/L (ref 12–78)
ANION GAP SERPL CALC-SCNC: 4 MMOL/L (ref 2–12)
AST SERPL-CCNC: 69 U/L (ref 15–37)
BILIRUB SERPL-MCNC: 0.5 MG/DL (ref 0.2–1)
BUN SERPL-MCNC: 16 MG/DL (ref 6–20)
BUN/CREAT SERPL: 20 (ref 12–20)
CALCIUM SERPL-MCNC: 9.6 MG/DL (ref 8.5–10.1)
CHLORIDE SERPL-SCNC: 101 MMOL/L (ref 97–108)
CHOLEST SERPL-MCNC: 135 MG/DL
CO2 SERPL-SCNC: 30 MMOL/L (ref 21–32)
CREAT SERPL-MCNC: 0.79 MG/DL (ref 0.7–1.3)
CREAT UR-MCNC: 164 MG/DL
EST. AVERAGE GLUCOSE BLD GHB EST-MCNC: 189 MG/DL
GLOBULIN SER CALC-MCNC: 2.9 G/DL (ref 2–4)
GLUCOSE SERPL-MCNC: 327 MG/DL (ref 65–100)
HBA1C MFR BLD: 8.2 % (ref 4–5.6)
HDLC SERPL-MCNC: 53 MG/DL
HDLC SERPL: 2.5 (ref 0–5)
LDLC SERPL CALC-MCNC: 55.2 MG/DL (ref 0–100)
MICROALBUMIN UR-MCNC: 4.84 MG/DL
MICROALBUMIN/CREAT UR-RTO: 30 MG/G (ref 0–30)
POTASSIUM SERPL-SCNC: 4.3 MMOL/L (ref 3.5–5.1)
PROT SERPL-MCNC: 6.6 G/DL (ref 6.4–8.2)
PSA SERPL-MCNC: 1.4 NG/ML (ref 0.01–4)
SODIUM SERPL-SCNC: 135 MMOL/L (ref 136–145)
TRIGL SERPL-MCNC: 134 MG/DL
VLDLC SERPL CALC-MCNC: 26.8 MG/DL

## 2025-08-08 PROBLEM — Z86.73 HISTORY OF STROKE: Status: ACTIVE | Noted: 2022-02-08

## 2025-08-23 DIAGNOSIS — I10 ESSENTIAL HYPERTENSION: ICD-10-CM

## 2025-08-25 RX ORDER — AMLODIPINE BESYLATE 5 MG/1
5 TABLET ORAL DAILY
Qty: 90 TABLET | Refills: 1 | Status: SHIPPED | OUTPATIENT
Start: 2025-08-25